# Patient Record
Sex: FEMALE | Race: WHITE | Employment: OTHER | URBAN - METROPOLITAN AREA
[De-identification: names, ages, dates, MRNs, and addresses within clinical notes are randomized per-mention and may not be internally consistent; named-entity substitution may affect disease eponyms.]

---

## 2017-01-12 RX ORDER — PRAVASTATIN SODIUM 10 MG
TABLET ORAL
Qty: 60 TABLET | Refills: 0 | Status: SHIPPED | OUTPATIENT
Start: 2017-01-12 | End: 2017-06-19

## 2017-05-17 ENCOUNTER — TELEPHONE (OUTPATIENT)
Dept: INTERNAL MEDICINE CLINIC | Facility: CLINIC | Age: 72
End: 2017-05-17

## 2017-05-17 NOTE — TELEPHONE ENCOUNTER
Pt calling back - pt has been taking lorazepam in the past     Stopped taking in October 2016     Spouse telling her he does not wish for her to take lorazepam because it causes memory loss     She is asking can she take something else     [phone]

## 2017-05-17 NOTE — TELEPHONE ENCOUNTER
Please advise - called patient who is grinding her teeth, dentist told patient nothing wrong with her teeth , it is anxiety. Patient has mouthguard but hard time sleeping with it. She was taking Lorazepam , but stopped in October.  Spouse does not want her

## 2017-05-17 NOTE — TELEPHONE ENCOUNTER
Pt is grinding her teeth and feel anxious pt when to the dentist and was told it was anxiety nothing wrong this her teeth. Pt like something prescribed for anxiety.

## 2017-05-17 NOTE — TELEPHONE ENCOUNTER
To nursing, I recom pt come in to be seen. I can see pt at 3:30 pm today--or come another day when she can come. Thanks.

## 2017-05-17 NOTE — PROGRESS NOTES
Candida Smith is a 66year old female who presents for     Pt called today requesting anxiety. She notes she is grinding her teeth and the dentist told her it is anxiety related.    Anxiety flaring for few wks \"in anticipation of\" going to nephew's we  age 63 multiple sclerosis   • Depression Mother    • Dementia Mother 80     Alzheimers   • Blindness Mother    • 3 siblings [Other] [OTHER]     • 2 daughters [Other] Sunny Argueta       1 had collapsed lung   • diverticulitis [Other] [OTHER] Mother    • Hyp Prescriptions:  escitalopram (LEXAPRO) 5 MG Oral Tab Take 1 tablet (5 mg total) by mouth daily.  Disp: 30 tablet Rfl: 5   PRAVASTATIN SODIUM 10 MG Oral Tab TAKE 1 TABLET BY MOUTH NIGHTLY Disp: 60 tablet Rfl: 0   Multiple Vitamins-Minerals (MULTI-VITAMIN/MIN

## 2017-05-17 NOTE — TELEPHONE ENCOUNTER
Called patient and relayed DR. SMALL message - verbalized understanding . Nohelia Baker put patient on schedule for today 3:30 with DR. Darrick Sotomayor

## 2017-06-19 NOTE — PROGRESS NOTES
Carole Juarez is a 66year old female who presents for     1 mo check for anxiety.    She notes the lexapro 5 mg daily added at 5/17/17 visit helped. Not grinding her teeth as much.   'the anxiety comes and goes. I'm fine around my old friends. \"  Is fea Hypertension Mother       Social History:     Smoking Status: Never Smoker                      Alcohol Use: Yes           0.0 oz/week       0 Standard drinks or equivalent per week       Comment: 2-3 glasses wine per week         Allergies:    Aricept [Do Multiple Vitamins-Minerals (MULTI-VITAMIN/MINERALS) Oral Tab Take 1 tablet by mouth daily. Disp:  Rfl:    Vitamin D3 (VITAMIN D3) 1000 UNITS Oral Tab Take 1 tablet by mouth daily.  Disp:  Rfl:    Fexofenadine HCl (ALLEGRA) 180 MG Oral Tab Take 1 tablet by

## 2017-08-14 ENCOUNTER — TELEPHONE (OUTPATIENT)
Dept: INTERNAL MEDICINE CLINIC | Facility: CLINIC | Age: 72
End: 2017-08-14

## 2017-08-14 ENCOUNTER — LAB ENCOUNTER (OUTPATIENT)
Dept: LAB | Age: 72
End: 2017-08-14
Attending: INTERNAL MEDICINE
Payer: MEDICARE

## 2017-08-14 DIAGNOSIS — E78.00 HYPERCHOLESTEROLEMIA: ICD-10-CM

## 2017-08-14 LAB
ALBUMIN SERPL BCP-MCNC: 3.8 G/DL (ref 3.5–4.8)
ALBUMIN/GLOB SERPL: 1.3 {RATIO} (ref 1–2)
ALP SERPL-CCNC: 46 U/L (ref 32–100)
ALT SERPL-CCNC: 18 U/L (ref 14–54)
ANION GAP SERPL CALC-SCNC: 8 MMOL/L (ref 0–18)
AST SERPL-CCNC: 19 U/L (ref 15–41)
BASOPHILS # BLD: 0 K/UL (ref 0–0.2)
BASOPHILS NFR BLD: 1 %
BILIRUB SERPL-MCNC: 0.8 MG/DL (ref 0.3–1.2)
BUN SERPL-MCNC: 20 MG/DL (ref 8–20)
BUN/CREAT SERPL: 21.1 (ref 10–20)
CALCIUM SERPL-MCNC: 9.2 MG/DL (ref 8.5–10.5)
CHLORIDE SERPL-SCNC: 104 MMOL/L (ref 95–110)
CHOLEST SERPL-MCNC: 309 MG/DL (ref 110–200)
CO2 SERPL-SCNC: 27 MMOL/L (ref 22–32)
CREAT SERPL-MCNC: 0.95 MG/DL (ref 0.5–1.5)
EOSINOPHIL # BLD: 0.2 K/UL (ref 0–0.7)
EOSINOPHIL NFR BLD: 4 %
ERYTHROCYTE [DISTWIDTH] IN BLOOD BY AUTOMATED COUNT: 12.8 % (ref 11–15)
GLOBULIN PLAS-MCNC: 3 G/DL (ref 2.5–3.7)
GLUCOSE SERPL-MCNC: 92 MG/DL (ref 70–99)
HCT VFR BLD AUTO: 40.8 % (ref 35–48)
HDLC SERPL-MCNC: 85 MG/DL
HGB BLD-MCNC: 14.1 G/DL (ref 12–16)
LDLC SERPL CALC-MCNC: 206 MG/DL (ref 0–99)
LYMPHOCYTES # BLD: 0.8 K/UL (ref 1–4)
LYMPHOCYTES NFR BLD: 19 %
MCH RBC QN AUTO: 33.3 PG (ref 27–32)
MCHC RBC AUTO-ENTMCNC: 34.7 G/DL (ref 32–37)
MCV RBC AUTO: 96 FL (ref 80–100)
MONOCYTES # BLD: 0.5 K/UL (ref 0–1)
MONOCYTES NFR BLD: 11 %
NEUTROPHILS # BLD AUTO: 2.9 K/UL (ref 1.8–7.7)
NEUTROPHILS NFR BLD: 66 %
NONHDLC SERPL-MCNC: 224 MG/DL
OSMOLALITY UR CALC.SUM OF ELEC: 290 MOSM/KG (ref 275–295)
PLATELET # BLD AUTO: 234 K/UL (ref 140–400)
PMV BLD AUTO: 7.8 FL (ref 7.4–10.3)
POTASSIUM SERPL-SCNC: 4.2 MMOL/L (ref 3.3–5.1)
PROT SERPL-MCNC: 6.8 G/DL (ref 5.9–8.4)
RBC # BLD AUTO: 4.25 M/UL (ref 3.7–5.4)
SODIUM SERPL-SCNC: 139 MMOL/L (ref 136–144)
TRIGL SERPL-MCNC: 89 MG/DL (ref 1–149)
TSH SERPL-ACNC: 2.95 UIU/ML (ref 0.45–5.33)
WBC # BLD AUTO: 4.4 K/UL (ref 4–11)

## 2017-08-14 PROCEDURE — 80061 LIPID PANEL: CPT

## 2017-08-14 PROCEDURE — 85025 COMPLETE CBC W/AUTO DIFF WBC: CPT

## 2017-08-14 PROCEDURE — 80053 COMPREHEN METABOLIC PANEL: CPT

## 2017-08-14 PROCEDURE — 81001 URINALYSIS AUTO W/SCOPE: CPT | Performed by: INTERNAL MEDICINE

## 2017-08-14 PROCEDURE — 36415 COLL VENOUS BLD VENIPUNCTURE: CPT

## 2017-08-14 PROCEDURE — 84443 ASSAY THYROID STIM HORMONE: CPT

## 2017-08-14 NOTE — TELEPHONE ENCOUNTER
To nursing,   please tell pt Lab done 8/14/17–CBC CMP TSH lipid UA– results are ok except cholesterol levels are quite high–total cholesterol 309, .   If she is willing to restart the pravastatin 10 mg daily that she has at home, she can do that unti

## 2017-08-14 NOTE — TELEPHONE ENCOUNTER
Called patient and relayed DR. SMALL message - patient verbalized understanding , states she does no thave any more pravastatin at home and will discuss at her appointment with DR. SMALL on 8/21

## 2017-08-17 PROBLEM — G30.1 LATE ONSET ALZHEIMER'S DISEASE WITHOUT BEHAVIORAL DISTURBANCE (HCC): Status: ACTIVE | Noted: 2017-08-17

## 2017-08-17 PROBLEM — F02.80 LATE ONSET ALZHEIMER'S DISEASE WITHOUT BEHAVIORAL DISTURBANCE (HCC): Status: ACTIVE | Noted: 2017-08-17

## 2017-08-21 ENCOUNTER — OFFICE VISIT (OUTPATIENT)
Dept: INTERNAL MEDICINE CLINIC | Facility: CLINIC | Age: 72
End: 2017-08-21

## 2017-08-21 VITALS
SYSTOLIC BLOOD PRESSURE: 130 MMHG | HEIGHT: 66 IN | HEART RATE: 79 BPM | TEMPERATURE: 98 F | OXYGEN SATURATION: 99 % | BODY MASS INDEX: 25.23 KG/M2 | DIASTOLIC BLOOD PRESSURE: 86 MMHG | WEIGHT: 157 LBS

## 2017-08-21 DIAGNOSIS — Z00.00 MEDICARE ANNUAL WELLNESS VISIT, SUBSEQUENT: Primary | ICD-10-CM

## 2017-08-21 DIAGNOSIS — F41.9 ANXIETY: ICD-10-CM

## 2017-08-21 DIAGNOSIS — E78.00 HYPERCHOLESTEROLEMIA: ICD-10-CM

## 2017-08-21 DIAGNOSIS — F02.80 LATE ONSET ALZHEIMER'S DISEASE WITHOUT BEHAVIORAL DISTURBANCE (HCC): ICD-10-CM

## 2017-08-21 DIAGNOSIS — G30.1 LATE ONSET ALZHEIMER'S DISEASE WITHOUT BEHAVIORAL DISTURBANCE (HCC): ICD-10-CM

## 2017-08-21 DIAGNOSIS — Z12.31 VISIT FOR SCREENING MAMMOGRAM: ICD-10-CM

## 2017-08-21 PROCEDURE — G0439 PPPS, SUBSEQ VISIT: HCPCS | Performed by: INTERNAL MEDICINE

## 2017-08-21 PROCEDURE — 99213 OFFICE O/P EST LOW 20 MIN: CPT | Performed by: INTERNAL MEDICINE

## 2017-08-21 PROCEDURE — G0463 HOSPITAL OUTPT CLINIC VISIT: HCPCS | Performed by: INTERNAL MEDICINE

## 2017-08-21 RX ORDER — PRAVASTATIN SODIUM 20 MG
20 TABLET ORAL NIGHTLY
Qty: 90 TABLET | Refills: 3 | Status: SHIPPED | OUTPATIENT
Start: 2017-08-21 | End: 2018-06-27 | Stop reason: DRUGHIGH

## 2017-08-21 NOTE — PROGRESS NOTES
Jazmyne Morris is a 66year old female who presents for     Medicare annual wellness and check at 2 mo. Saw Dr Lisa Sena at [facility] on 8/17/17. Prev neurologist was Dr. Nevin Ray. Dx Alzheimers and pt didn't tolerate namenda or aricept.  She presc status: Never Smoker                                                              Smokeless tobacco: Never Used                      Alcohol use: Yes           0.0 oz/week     Comment: 2-3 glasses wine per week         Allergies:    Aricept [Donepezil] of 8/2017 she dx alzheimers. No longer sees Dr Ryan Whittington. Has been intol of aricept (dizziness) and namenda. She rx exelon patch.  Is awaiting ins approval.   MRI brain with and w/o contrast  4/23/15--mild white matter changes c/w microvascular ischemia.     H tablet Rfl: 5   Multiple Vitamins-Minerals (MULTI-VITAMIN/MINERALS) Oral Tab Take 1 tablet by mouth daily. Disp:  Rfl:    Vitamin D3 (VITAMIN D3) 1000 UNITS Oral Tab Take 1 tablet by mouth daily.  Disp:  Rfl:    Fexofenadine HCl (ALLEGRA) 180 MG Oral Tab Ta multiple medications?: 1-Yes    Does pain affect your day to day activities?: 0-No     Have you had any memory issues?: 1-Yes    Fall/Risk Scorin    Scoring Interpretation: 0 - 3 No Risk     Depression Screening (PHQ-2/PHQ-9): Over the LAST 2 WEEKS   L 20/30 Left Eye Chart Acuity: 20/30     Cognitive Assessment     What day of the week is this?: Correct    What month is it?: Correct    What year is it?: Correct    Recall \"Ball\": Correct    Recall \"Flag\": Correct    Recall \"Tree\": Correct

## 2017-10-10 ENCOUNTER — TELEPHONE (OUTPATIENT)
Dept: INTERNAL MEDICINE CLINIC | Facility: CLINIC | Age: 72
End: 2017-10-10

## 2017-10-10 RX ORDER — ESCITALOPRAM OXALATE 10 MG/1
10 TABLET ORAL DAILY
Qty: 30 TABLET | Refills: 5 | Status: SHIPPED | OUTPATIENT
Start: 2017-10-10 | End: 2018-03-14

## 2017-10-10 NOTE — TELEPHONE ENCOUNTER
Pt is taking escitalopram     Pt takes one pill a day and is asking to talk to doctor about increasing it     When I asked pt why she states her  told her they are not working     Pharm using [facility]     Please call pt at [phone]

## 2017-10-10 NOTE — TELEPHONE ENCOUNTER
To Dr. Chinmay Dougherty - see below - pt/ think pt is a bit more anxious - going to see rex play football at [facility] Friday-Sunday. Can med be increased?

## 2017-10-12 ENCOUNTER — HOSPITAL ENCOUNTER (OUTPATIENT)
Dept: MAMMOGRAPHY | Facility: HOSPITAL | Age: 72
Discharge: HOME | End: 2017-10-12
Attending: INTERNAL MEDICINE
Payer: MEDICARE

## 2017-10-12 DIAGNOSIS — Z12.31 VISIT FOR SCREENING MAMMOGRAM: ICD-10-CM

## 2017-10-12 PROCEDURE — 77063 BREAST TOMOSYNTHESIS BI: CPT | Performed by: INTERNAL MEDICINE

## 2017-10-12 PROCEDURE — 77067 SCR MAMMO BI INCL CAD: CPT | Performed by: INTERNAL MEDICINE

## 2017-11-29 ENCOUNTER — OFFICE VISIT (OUTPATIENT)
Dept: INTERNAL MEDICINE CLINIC | Facility: CLINIC | Age: 72
End: 2017-11-29

## 2017-11-29 VITALS
SYSTOLIC BLOOD PRESSURE: 120 MMHG | BODY MASS INDEX: 25.23 KG/M2 | HEIGHT: 66 IN | OXYGEN SATURATION: 98 % | WEIGHT: 157 LBS | DIASTOLIC BLOOD PRESSURE: 86 MMHG | HEART RATE: 70 BPM | TEMPERATURE: 98 F

## 2017-11-29 DIAGNOSIS — G30.1 LATE ONSET ALZHEIMER'S DISEASE WITHOUT BEHAVIORAL DISTURBANCE (HCC): ICD-10-CM

## 2017-11-29 DIAGNOSIS — J06.9 URI, ACUTE: ICD-10-CM

## 2017-11-29 DIAGNOSIS — F41.9 ANXIETY: ICD-10-CM

## 2017-11-29 DIAGNOSIS — E78.00 HYPERCHOLESTEROLEMIA: Primary | ICD-10-CM

## 2017-11-29 DIAGNOSIS — F02.80 LATE ONSET ALZHEIMER'S DISEASE WITHOUT BEHAVIORAL DISTURBANCE (HCC): ICD-10-CM

## 2017-11-29 PROCEDURE — G0463 HOSPITAL OUTPT CLINIC VISIT: HCPCS | Performed by: INTERNAL MEDICINE

## 2017-11-29 PROCEDURE — 99214 OFFICE O/P EST MOD 30 MIN: CPT | Performed by: INTERNAL MEDICINE

## 2017-11-29 RX ORDER — AZITHROMYCIN 250 MG/1
TABLET, FILM COATED ORAL
Qty: 1 PACKAGE | Refills: 0 | Status: SHIPPED | OUTPATIENT
Start: 2017-11-29 | End: 2017-12-11

## 2017-11-29 NOTE — PROGRESS NOTES
Gary Engel is a 66year old female who presents for     3 month check    Cold sx. Head congestion and cough for a wk. No fever. No sputum. Tried cough syrup. Pt feels she caught a cold coming home on plane from [place].       Seeing Dr Julissa Mcallister at [facility] siblings Colby Lachelle Other    • 2 daughters Colby Larose Other      1 had collapsed lung   • Breast Cancer Maternal Grandmother      post      Social History:   Smoking status: Never Smoker                                                              Smokeless toba Anxiety--doing well on lexapro 10 mg daily -dose incr in phone call here 10/10/17.    Wishes to avoid lorazepam she had in the past b/c of the mild cognitive impairment.    went back to counsellor- Marty Sky Dr (LEXAPRO) 10 MG Oral Tab Take 1 tablet (10 mg total) by mouth daily. Disp: 30 tablet Rfl: 5   Pravastatin Sodium 20 MG Oral Tab Take 1 tablet (20 mg total) by mouth nightly.  Disp: 90 tablet Rfl: 3   rivastigmine (EXELON) 9.5 MG/24HR Transdermal Patch 24 Hr

## 2018-03-14 ENCOUNTER — OFFICE VISIT (OUTPATIENT)
Dept: INTERNAL MEDICINE CLINIC | Facility: CLINIC | Age: 73
End: 2018-03-14

## 2018-03-14 VITALS
HEIGHT: 66 IN | HEART RATE: 85 BPM | WEIGHT: 167 LBS | TEMPERATURE: 98 F | DIASTOLIC BLOOD PRESSURE: 86 MMHG | SYSTOLIC BLOOD PRESSURE: 134 MMHG | OXYGEN SATURATION: 98 % | BODY MASS INDEX: 26.84 KG/M2

## 2018-03-14 DIAGNOSIS — B07.9 WART OF SCALP: Primary | ICD-10-CM

## 2018-03-14 DIAGNOSIS — F41.9 ANXIETY: ICD-10-CM

## 2018-03-14 DIAGNOSIS — E78.00 HYPERCHOLESTEROLEMIA: ICD-10-CM

## 2018-03-14 DIAGNOSIS — F02.80 LATE ONSET ALZHEIMER'S DISEASE WITHOUT BEHAVIORAL DISTURBANCE (HCC): ICD-10-CM

## 2018-03-14 DIAGNOSIS — G30.1 LATE ONSET ALZHEIMER'S DISEASE WITHOUT BEHAVIORAL DISTURBANCE (HCC): ICD-10-CM

## 2018-03-14 PROCEDURE — G0463 HOSPITAL OUTPT CLINIC VISIT: HCPCS | Performed by: INTERNAL MEDICINE

## 2018-03-14 PROCEDURE — 99214 OFFICE O/P EST MOD 30 MIN: CPT | Performed by: INTERNAL MEDICINE

## 2018-03-14 RX ORDER — ESCITALOPRAM OXALATE 10 MG/1
10 TABLET ORAL DAILY
Qty: 90 TABLET | Refills: 3 | Status: SHIPPED | OUTPATIENT
Start: 2018-03-14 | End: 2019-08-01

## 2018-03-14 NOTE — PROGRESS NOTES
Jamil Vanegas is a 67year old female who presents for     3 month check     Cold sx of 11/29/18 resolved w zpack. Just ret from a trip from [facility] and Herlinda Sampson at [facility] since 8/2017.  Pt feels she is doing well w exelon pill Mother    • diverticulitis Lucy Kimball Mother    • 3 siblings Lucy Kimball Other    • 2 daughters Lucy Kimball Other      1 had collapsed lung   • Breast Cancer Maternal Grandmother      post      Social History:   Smoking status: Never Smoker hepatosplenomegaly  Extremities: no edema  Neurologic: alert and oriented  Affect -normal.    ASSESSMENT AND PLAN:     Wart on scalp--see her dermatologist--she can't recall name. I gave her a note--says her husb will know.      Hypercholesterolemia:  LDL 2 waiting room to ask him to help her remember to do this.      Ret in 3 months.      Patient expresses understanding of above issues and plan.        Current Outpatient Prescriptions:  Rivastigmine Tartrate (EXELON) 3 MG Oral Cap 1 po bid for one month, then

## 2018-06-15 ENCOUNTER — OFFICE VISIT (OUTPATIENT)
Dept: INTERNAL MEDICINE CLINIC | Facility: CLINIC | Age: 73
End: 2018-06-15

## 2018-06-15 VITALS
TEMPERATURE: 98 F | HEIGHT: 66 IN | DIASTOLIC BLOOD PRESSURE: 82 MMHG | HEART RATE: 84 BPM | WEIGHT: 171 LBS | BODY MASS INDEX: 27.48 KG/M2 | SYSTOLIC BLOOD PRESSURE: 136 MMHG

## 2018-06-15 DIAGNOSIS — F41.9 ANXIETY: ICD-10-CM

## 2018-06-15 DIAGNOSIS — G30.1 LATE ONSET ALZHEIMER'S DISEASE WITHOUT BEHAVIORAL DISTURBANCE (HCC): ICD-10-CM

## 2018-06-15 DIAGNOSIS — E78.00 HYPERCHOLESTEROLEMIA: Primary | ICD-10-CM

## 2018-06-15 DIAGNOSIS — F02.80 LATE ONSET ALZHEIMER'S DISEASE WITHOUT BEHAVIORAL DISTURBANCE (HCC): ICD-10-CM

## 2018-06-15 PROCEDURE — 99214 OFFICE O/P EST MOD 30 MIN: CPT | Performed by: INTERNAL MEDICINE

## 2018-06-15 PROCEDURE — G0463 HOSPITAL OUTPT CLINIC VISIT: HCPCS | Performed by: INTERNAL MEDICINE

## 2018-06-15 NOTE — PROGRESS NOTES
Shirlene Lara is a 67year old female who presents for     3 month check     Feels good. Going on a trip to [facility] in Aug.      Seeing Dr Bo Bingham at [address] [place] [address] Neurology since 8/2017.    Pt feels her memory is doing well w exelon pills--the patch Smokeless tobacco: Never Used                      Alcohol use: Yes           1.2 - 1.8 oz/week     Glasses of wine: 2 - 3 per week     Comment: 2-3 glasses wine per week         Allergies:    Aricept [Donepezil]         Comment:Dizziness  Atorva daily--pt had stopped in 5/2017 b/c of fears it would affect her memory. Reminder again to do LIPID AST in 6 wks. Hx lipitor caused leg pain.      Anxiety--doing well on lexapro 10 mg daily.  Avoid lorazepam had in the past b/c of cognitive impairment.    W tablet Rfl: 3   Rivastigmine Tartrate 6 MG Oral Cap Take 1 capsule (6 mg total) by mouth 2 (two) times daily. Disp: 60 capsule Rfl: 5   Multiple Vitamins-Minerals (MULTI-VITAMIN/MINERALS) Oral Tab Take 1 tablet by mouth daily.  Disp:  Rfl:    Vitamin D3 (VI

## 2018-06-22 ENCOUNTER — APPOINTMENT (OUTPATIENT)
Dept: LAB | Facility: HOSPITAL | Age: 73
End: 2018-06-22
Attending: INTERNAL MEDICINE
Payer: MEDICARE

## 2018-06-22 DIAGNOSIS — E78.00 HYPERCHOLESTEROLEMIA: ICD-10-CM

## 2018-06-22 PROCEDURE — 84450 TRANSFERASE (AST) (SGOT): CPT

## 2018-06-22 PROCEDURE — 36415 COLL VENOUS BLD VENIPUNCTURE: CPT

## 2018-06-22 PROCEDURE — 80061 LIPID PANEL: CPT

## 2018-06-27 ENCOUNTER — TELEPHONE (OUTPATIENT)
Dept: INTERNAL MEDICINE CLINIC | Facility: CLINIC | Age: 73
End: 2018-06-27

## 2018-06-27 DIAGNOSIS — E78.00 HYPERCHOLESTEROLEMIA: Primary | ICD-10-CM

## 2018-06-27 RX ORDER — PRAVASTATIN SODIUM 40 MG
40 TABLET ORAL NIGHTLY
Qty: 90 TABLET | Refills: 3 | Status: SHIPPED | OUTPATIENT
Start: 2018-06-27 | End: 2018-08-17 | Stop reason: ALTCHOICE

## 2018-06-27 NOTE — TELEPHONE ENCOUNTER
I sent patient results note Reinaldo Soto, I received your lab results from 6/22/18. Your cholesterol panel shows your LDL cholesterol remains elevated at 178;  albeit improved from LDL cholesterol 206 on 8/14/17.   I would like her LDL to be below

## 2018-06-27 NOTE — TELEPHONE ENCOUNTER
To nursing, please call patient on Thursday, 6/28/18 and tell patient to review the results note email I sent--if not already done. Thanks.

## 2018-06-28 NOTE — TELEPHONE ENCOUNTER
Called patient , spoke with  per hipaa ( spouse close by) and relayed DR. SMALL message - patient was looking at message while Rn on phone with spouse - will call with any questions

## 2018-07-12 ENCOUNTER — HOSPITAL (OUTPATIENT)
Dept: OTHER | Age: 73
End: 2018-07-12
Attending: EMERGENCY MEDICINE

## 2018-07-12 LAB
ALBUMIN SERPL-MCNC: 3.4 GM/DL (ref 3.6–5.1)
ALBUMIN/GLOB SERPL: 0.9 {RATIO} (ref 1–2.4)
ALP SERPL-CCNC: 61 UNIT/L (ref 45–117)
ALT SERPL-CCNC: 25 UNIT/L
ANALYZER ANC (IANC): ABNORMAL
ANION GAP SERPL CALC-SCNC: 17 MMOL/L (ref 10–20)
AST SERPL-CCNC: 26 UNIT/L
BASOPHILS # BLD: 0 THOUSAND/MCL (ref 0–0.3)
BASOPHILS NFR BLD: 1 %
BILIRUB SERPL-MCNC: 0.4 MG/DL (ref 0.2–1)
BUN SERPL-MCNC: 21 MG/DL (ref 6–20)
BUN/CREAT SERPL: 21 (ref 7–25)
CALCIUM SERPL-MCNC: 8.3 MG/DL (ref 8.4–10.2)
CHLORIDE: 103 MMOL/L (ref 98–107)
CO2 SERPL-SCNC: 23 MMOL/L (ref 21–32)
CREAT SERPL-MCNC: 0.98 MG/DL (ref 0.51–0.95)
DIFFERENTIAL METHOD BLD: ABNORMAL
EOSINOPHIL # BLD: 0.1 THOUSAND/MCL (ref 0.1–0.5)
EOSINOPHIL NFR BLD: 3 %
ERYTHROCYTE [DISTWIDTH] IN BLOOD: 12.5 % (ref 11–15)
GLOBULIN SER-MCNC: 3.6 GM/DL (ref 2–4)
GLUCOSE SERPL-MCNC: 112 MG/DL (ref 65–99)
HEMATOCRIT: 37.3 % (ref 36–46.5)
HGB BLD-MCNC: 12.8 GM/DL (ref 12–15.5)
LYMPHOCYTES # BLD: 1.5 THOUSAND/MCL (ref 1–4)
LYMPHOCYTES NFR BLD: 34 %
MCH RBC QN AUTO: 33.1 PG (ref 26–34)
MCHC RBC AUTO-ENTMCNC: 34.3 GM/DL (ref 32–36.5)
MCV RBC AUTO: 96.4 FL (ref 78–100)
MONOCYTES # BLD: 0.6 THOUSAND/MCL (ref 0.3–0.9)
MONOCYTES NFR BLD: 15 %
NEUTROPHILS # BLD: 2.1 THOUSAND/MCL (ref 1.8–7.7)
NEUTROPHILS NFR BLD: 47 %
NEUTS SEG NFR BLD: ABNORMAL %
NRBC (NRBCRE): ABNORMAL
PLATELET # BLD: 191 THOUSAND/MCL (ref 140–450)
POTASSIUM SERPL-SCNC: 3.7 MMOL/L (ref 3.4–5.1)
PROT SERPL-MCNC: 7 GM/DL (ref 6.4–8.2)
RBC # BLD: 3.87 MILLION/MCL (ref 4–5.2)
SODIUM SERPL-SCNC: 139 MMOL/L (ref 135–145)
TROPONIN I SERPL HS-MCNC: <0.02 NG/ML
WBC # BLD: 4.3 THOUSAND/MCL (ref 4.2–11)

## 2018-07-19 ENCOUNTER — OFFICE VISIT (OUTPATIENT)
Dept: INTERNAL MEDICINE CLINIC | Facility: CLINIC | Age: 73
End: 2018-07-19
Payer: MEDICARE

## 2018-07-19 VITALS
HEART RATE: 77 BPM | OXYGEN SATURATION: 98 % | TEMPERATURE: 98 F | HEIGHT: 66 IN | SYSTOLIC BLOOD PRESSURE: 120 MMHG | DIASTOLIC BLOOD PRESSURE: 76 MMHG

## 2018-07-19 DIAGNOSIS — S61.210S LACERATION OF RIGHT INDEX FINGER WITHOUT FOREIGN BODY WITHOUT DAMAGE TO NAIL, SEQUELA: Primary | ICD-10-CM

## 2018-07-19 PROCEDURE — 99213 OFFICE O/P EST LOW 20 MIN: CPT | Performed by: INTERNAL MEDICINE

## 2018-07-19 PROCEDURE — G0463 HOSPITAL OUTPT CLINIC VISIT: HCPCS | Performed by: INTERNAL MEDICINE

## 2018-07-19 NOTE — PROGRESS NOTES
Blair Resendiz is a 67year old female.     HPI:   Patient presents with:  Suture Removal: Patient is here for suture removal.      68 y/o F who suffered laceration to right index finger 7 d ago when cleaning son's apartment; pt was seen at [facility] 1.8 oz/week     Glasses of wine: 2 - 3 per week     Comment: 2-3 glasses wine per week       Medications (Active prior to today's visit):    Current Outpatient Prescriptions:  Pravastatin Sodium 40 MG Oral Tab Take 1 tablet (40 mg total) by mouth nightly. index finger with two sutures in placed; sutures removed; wound intact         ASSESSMENT/PLAN:   Finger laceration  fingerpad to right index finger with two sutures in placed; sutures removed; wound intact; bandage applied       Orders This Visit:  No ord

## 2018-08-14 ENCOUNTER — APPOINTMENT (OUTPATIENT)
Dept: LAB | Facility: HOSPITAL | Age: 73
End: 2018-08-14
Attending: INTERNAL MEDICINE
Payer: MEDICARE

## 2018-08-14 DIAGNOSIS — E78.00 HYPERCHOLESTEROLEMIA: ICD-10-CM

## 2018-08-14 LAB
AST SERPL-CCNC: 25 U/L (ref 15–41)
CHOLEST SERPL-MCNC: 260 MG/DL (ref 110–200)
HDLC SERPL-MCNC: 72 MG/DL
LDLC SERPL CALC-MCNC: 168 MG/DL (ref 0–99)
NONHDLC SERPL-MCNC: 188 MG/DL
TRIGL SERPL-MCNC: 99 MG/DL (ref 1–149)

## 2018-08-14 PROCEDURE — 80061 LIPID PANEL: CPT

## 2018-08-14 PROCEDURE — 36415 COLL VENOUS BLD VENIPUNCTURE: CPT

## 2018-08-14 PROCEDURE — 84450 TRANSFERASE (AST) (SGOT): CPT

## 2018-08-17 ENCOUNTER — TELEPHONE (OUTPATIENT)
Dept: INTERNAL MEDICINE CLINIC | Facility: CLINIC | Age: 73
End: 2018-08-17

## 2018-08-17 DIAGNOSIS — E78.00 HYPERCHOLESTEROLEMIA: Primary | ICD-10-CM

## 2018-08-17 RX ORDER — ROSUVASTATIN CALCIUM 10 MG/1
10 TABLET, COATED ORAL NIGHTLY
Qty: 90 TABLET | Refills: 3 | Status: SHIPPED | OUTPATIENT
Start: 2018-08-17 | End: 2018-12-31

## 2018-08-17 NOTE — TELEPHONE ENCOUNTER
To nursing,   please tell pt --best to talk to her  b/c pt has some memory problems-- results of lab done on 8/14/18-lipid AST--results show her LDL cholesterol is still elevated at 168--was 178 in June before increasing pravastatin to 40 mg daily.

## 2018-08-17 NOTE — TELEPHONE ENCOUNTER
calledpatient , spoke with spouse per hipaa andpatient and relayed DR. SMALL message - verbalized understanding .  RX sent

## 2018-09-19 ENCOUNTER — OFFICE VISIT (OUTPATIENT)
Dept: INTERNAL MEDICINE CLINIC | Facility: CLINIC | Age: 73
End: 2018-09-19
Payer: MEDICARE

## 2018-09-19 VITALS
BODY MASS INDEX: 27.77 KG/M2 | HEART RATE: 75 BPM | DIASTOLIC BLOOD PRESSURE: 80 MMHG | HEIGHT: 66 IN | WEIGHT: 172.81 LBS | SYSTOLIC BLOOD PRESSURE: 130 MMHG | OXYGEN SATURATION: 99 % | TEMPERATURE: 98 F

## 2018-09-19 DIAGNOSIS — Z12.31 VISIT FOR SCREENING MAMMOGRAM: ICD-10-CM

## 2018-09-19 DIAGNOSIS — D32.9 MENINGIOMA (HCC): ICD-10-CM

## 2018-09-19 DIAGNOSIS — G30.1 LATE ONSET ALZHEIMER'S DISEASE WITHOUT BEHAVIORAL DISTURBANCE (HCC): ICD-10-CM

## 2018-09-19 DIAGNOSIS — F41.9 ANXIETY: ICD-10-CM

## 2018-09-19 DIAGNOSIS — F02.80 LATE ONSET ALZHEIMER'S DISEASE WITHOUT BEHAVIORAL DISTURBANCE (HCC): ICD-10-CM

## 2018-09-19 DIAGNOSIS — E78.00 HYPERCHOLESTEROLEMIA: Primary | ICD-10-CM

## 2018-09-19 PROCEDURE — 90653 IIV ADJUVANT VACCINE IM: CPT | Performed by: INTERNAL MEDICINE

## 2018-09-19 PROCEDURE — G0463 HOSPITAL OUTPT CLINIC VISIT: HCPCS | Performed by: INTERNAL MEDICINE

## 2018-09-19 PROCEDURE — 99214 OFFICE O/P EST MOD 30 MIN: CPT | Performed by: INTERNAL MEDICINE

## 2018-09-19 PROCEDURE — G0008 ADMIN INFLUENZA VIRUS VAC: HCPCS | Performed by: INTERNAL MEDICINE

## 2018-09-19 NOTE — PROGRESS NOTES
Nabor Navarro is a 67year old female who presents for     3 month check     Feels good. Enjoyed a trip to [facility] in Aug.     Hypercholesterolemia-pravastatin was increased to 40 mg daily in June after .  Then on follow up lab in Aug LDL Breast Cancer Maternal Grandmother         post      Social History:   Social History    Tobacco Use      Smoking status: Never Smoker      Smokeless tobacco: Never Used    Alcohol use:  Yes      Alcohol/week: 1.2 - 1.8 oz      Types: 2 - 3 Glasses of wine lab. Hx lipitor caused leg pain and pravastatin not effective.      Anxiety--doing well on lexapro 10 mg daily. Avoid lorazepam had in the past b/c of cognitive impairment.    When needs counsellor, sees Romina eLe LCSW.     Alzheimer's disease--sees Medications:  Rosuvastatin Calcium 10 MG Oral Tab Take 1 tablet (10 mg total) by mouth nightly. Disp: 90 tablet Rfl: 3   escitalopram (LEXAPRO) 10 MG Oral Tab Take 1 tablet (10 mg total) by mouth daily.  Disp: 90 tablet Rfl: 3   Multiple Vitamins-Minerals (

## 2018-10-31 ENCOUNTER — TELEPHONE (OUTPATIENT)
Dept: INTERNAL MEDICINE CLINIC | Facility: CLINIC | Age: 73
End: 2018-10-31

## 2018-10-31 ENCOUNTER — HOSPITAL ENCOUNTER (OUTPATIENT)
Dept: CT IMAGING | Facility: HOSPITAL | Age: 73
Discharge: HOME | End: 2018-10-31
Attending: INTERNAL MEDICINE
Payer: MEDICARE

## 2018-10-31 DIAGNOSIS — D32.9 MENINGIOMA (HCC): ICD-10-CM

## 2018-10-31 PROCEDURE — 70450 CT HEAD/BRAIN W/O DYE: CPT | Performed by: INTERNAL MEDICINE

## 2018-10-31 NOTE — TELEPHONE ENCOUNTER
To nursing, please tell patient–and also please tell her  because patient has history of some memory problems– CAT scan of the head done today 10/31/18–results are okay.    the 2 cm meningioma is the same size as what was described on the CAT scan of

## 2018-10-31 NOTE — TELEPHONE ENCOUNTER
Called patient and relayed Dr Juan Manuel Ulrich message to her. She verbalized understanding and will make appt with eye doctor. Also relayed message to patient's  Buck Leiva. He verbalized understanding.

## 2018-11-28 ENCOUNTER — TELEPHONE (OUTPATIENT)
Dept: INTERNAL MEDICINE CLINIC | Facility: CLINIC | Age: 73
End: 2018-11-28

## 2018-11-29 NOTE — TELEPHONE ENCOUNTER
To nursing, please tell patient I received report of bilateral screening mammogram done 11/7/18 at [facility].  The report shows 2 asymmetric densities in the left breast for which they recommend she go back for additional views.   Please a

## 2018-11-29 NOTE — TELEPHONE ENCOUNTER
Called patient , spoke with spouse per hipaa and relayed DR. SMALL message - verbalized understanding.  Order request faxed to Elizabeth Llamas at [phone]- confirmation received - to scanning

## 2018-12-06 ENCOUNTER — TELEPHONE (OUTPATIENT)
Dept: INTERNAL MEDICINE CLINIC | Facility: CLINIC | Age: 73
End: 2018-12-06

## 2018-12-06 NOTE — TELEPHONE ENCOUNTER
To nursing, please tell patient 11/30/18- L mammo additional views and US L breast--results are okay, benign findings. The radiologist recommends next mammogram in 1 year. [facility]  Thanks.

## 2018-12-17 ENCOUNTER — OFFICE VISIT (OUTPATIENT)
Dept: INTERNAL MEDICINE CLINIC | Facility: CLINIC | Age: 73
End: 2018-12-17
Payer: MEDICARE

## 2018-12-17 VITALS
SYSTOLIC BLOOD PRESSURE: 140 MMHG | DIASTOLIC BLOOD PRESSURE: 80 MMHG | BODY MASS INDEX: 28.57 KG/M2 | HEART RATE: 83 BPM | OXYGEN SATURATION: 97 % | TEMPERATURE: 98 F | HEIGHT: 66 IN | WEIGHT: 177.81 LBS

## 2018-12-17 DIAGNOSIS — F02.80 LATE ONSET ALZHEIMER'S DISEASE WITHOUT BEHAVIORAL DISTURBANCE (HCC): ICD-10-CM

## 2018-12-17 DIAGNOSIS — F41.9 ANXIETY: ICD-10-CM

## 2018-12-17 DIAGNOSIS — D32.9 MENINGIOMA (HCC): ICD-10-CM

## 2018-12-17 DIAGNOSIS — R03.0 BORDERLINE BLOOD PRESSURE: ICD-10-CM

## 2018-12-17 DIAGNOSIS — E78.00 HYPERCHOLESTEROLEMIA: Primary | ICD-10-CM

## 2018-12-17 DIAGNOSIS — G30.1 LATE ONSET ALZHEIMER'S DISEASE WITHOUT BEHAVIORAL DISTURBANCE (HCC): ICD-10-CM

## 2018-12-17 PROCEDURE — G0463 HOSPITAL OUTPT CLINIC VISIT: HCPCS | Performed by: INTERNAL MEDICINE

## 2018-12-17 PROCEDURE — 99214 OFFICE O/P EST MOD 30 MIN: CPT | Performed by: INTERNAL MEDICINE

## 2018-12-17 NOTE — PROGRESS NOTES
Michelle Doyle is a 67year old female who presents for     3 month check     Feels good.      Hypercholesterolemia-tolerating crestor 10 mg daily. Not yet done f/u lab tests.      Alzheimers    Seeing Dr Julio Cesar Edmondson at [facility] since 8/2017.    She rx Blindness Mother    • Hypertension Mother    • Other (diverticulitis) Mother    • Other (3 siblings) Other    • Other (2 daughters) Other         1 had collapsed lung   • Breast Cancer Maternal Grandmother         post      Social History:   Social History AND PLAN:     BP borderline 140/80--will watch salt and lose wt. Check BP again at next visit. Hypercholesterolemia--on crestor 10 mg daily since 8/2018. To do f/u lab-reminder.  Hx lipitor caused leg pain and pravastatin not effective.      Anxiety--d instructions again given).       Ret in 3 months.      Patient expresses understanding of above issues and plan. Current Outpatient Medications:  Rosuvastatin Calcium 10 MG Oral Tab Take 1 tablet (10 mg total) by mouth nightly.  Disp: 90 tablet Rfl: 3

## 2018-12-21 ENCOUNTER — LAB ENCOUNTER (OUTPATIENT)
Dept: LAB | Facility: HOSPITAL | Age: 73
End: 2018-12-21
Attending: INTERNAL MEDICINE
Payer: MEDICARE

## 2018-12-21 DIAGNOSIS — E78.00 HYPERCHOLESTEROLEMIA: ICD-10-CM

## 2018-12-21 PROCEDURE — 85025 COMPLETE CBC W/AUTO DIFF WBC: CPT

## 2018-12-21 PROCEDURE — 80061 LIPID PANEL: CPT

## 2018-12-21 PROCEDURE — 36415 COLL VENOUS BLD VENIPUNCTURE: CPT

## 2018-12-21 PROCEDURE — 81001 URINALYSIS AUTO W/SCOPE: CPT | Performed by: INTERNAL MEDICINE

## 2018-12-21 PROCEDURE — 80053 COMPREHEN METABOLIC PANEL: CPT

## 2018-12-21 PROCEDURE — 84443 ASSAY THYROID STIM HORMONE: CPT

## 2018-12-30 ENCOUNTER — TELEPHONE (OUTPATIENT)
Dept: INTERNAL MEDICINE CLINIC | Facility: CLINIC | Age: 73
End: 2018-12-30

## 2018-12-30 DIAGNOSIS — E78.5 HYPERLIPIDEMIA, UNSPECIFIED HYPERLIPIDEMIA TYPE: Primary | ICD-10-CM

## 2018-12-30 DIAGNOSIS — R31.29 MICROHEMATURIA: ICD-10-CM

## 2018-12-30 NOTE — TELEPHONE ENCOUNTER
To nursing, please tell pt and please you will also need to tell her  b/c pt has memory problems:  Lab done 12/21/18-cbc cmp tsh lipid ua--  1) LDL choesterol is 139, improved from  in 8/2018. But would like LDL below 100 if possible.    Incre

## 2018-12-31 RX ORDER — ROSUVASTATIN CALCIUM 20 MG/1
20 TABLET, COATED ORAL NIGHTLY
Qty: 90 TABLET | Refills: 3 | Status: SHIPPED | OUTPATIENT
Start: 2018-12-31 | End: 2020-04-09

## 2018-12-31 NOTE — TELEPHONE ENCOUNTER
I spoke with patient's  and I relayed Dr. Man Holt message. He verbalized understanding. I sent in the prescription to patient's local pharmacy as requested.  I provided patient's  with the contact information for Dr. Claudette Jacob office and

## 2019-01-02 ENCOUNTER — APPOINTMENT (OUTPATIENT)
Dept: LAB | Facility: HOSPITAL | Age: 74
End: 2019-01-02
Attending: INTERNAL MEDICINE
Payer: MEDICARE

## 2019-01-02 DIAGNOSIS — R31.29 MICROHEMATURIA: ICD-10-CM

## 2019-01-02 PROCEDURE — 87086 URINE CULTURE/COLONY COUNT: CPT

## 2019-01-08 ENCOUNTER — TELEPHONE (OUTPATIENT)
Dept: INTERNAL MEDICINE CLINIC | Facility: CLINIC | Age: 74
End: 2019-01-08

## 2019-01-08 NOTE — TELEPHONE ENCOUNTER
To nursing, please tell patient and please you also need to tell her  because patient has memory problems–  Urine culture 1/2/19–results are negative. She has no urinary tract infection to explain the microscopic blood in the urine.   Go ahead and s

## 2019-01-08 NOTE — TELEPHONE ENCOUNTER
Spoke with pts  to relay MD message below. He voiced understanding and will make sure Jarod Maguire follows up with Dr. Daniel Lozano / Chinyere Rider as advised.

## 2019-02-14 ENCOUNTER — APPOINTMENT (OUTPATIENT)
Dept: LAB | Facility: HOSPITAL | Age: 74
End: 2019-02-14
Attending: INTERNAL MEDICINE
Payer: MEDICARE

## 2019-02-14 DIAGNOSIS — E78.5 HYPERLIPIDEMIA, UNSPECIFIED HYPERLIPIDEMIA TYPE: ICD-10-CM

## 2019-02-14 LAB
ALT SERPL-CCNC: 22 U/L (ref 13–56)
AST SERPL-CCNC: 13 U/L (ref 15–37)
CHOLEST SMN-MCNC: 194 MG/DL (ref ?–200)
HDLC SERPL-MCNC: 80 MG/DL (ref 40–59)
LDLC SERPL CALC-MCNC: 99 MG/DL (ref ?–100)
NONHDLC SERPL-MCNC: 114 MG/DL (ref ?–130)
TRIGL SERPL-MCNC: 74 MG/DL (ref 30–149)

## 2019-02-14 PROCEDURE — 36415 COLL VENOUS BLD VENIPUNCTURE: CPT

## 2019-02-14 PROCEDURE — 84460 ALANINE AMINO (ALT) (SGPT): CPT

## 2019-02-14 PROCEDURE — 80061 LIPID PANEL: CPT

## 2019-02-14 PROCEDURE — 84450 TRANSFERASE (AST) (SGOT): CPT

## 2019-02-17 ENCOUNTER — TELEPHONE (OUTPATIENT)
Dept: INTERNAL MEDICINE CLINIC | Facility: CLINIC | Age: 74
End: 2019-02-17

## 2019-02-17 NOTE — TELEPHONE ENCOUNTER
To nursing, please tell pt's  --best to tell him due to pt's memory difficulty--lab done 2/14/19-lipid ast alt--results are normal. LDL is good at 99. Continue rosuvastatin 20 mg daily. See me 3/18/19 as planned. Thanks.

## 2019-03-08 ENCOUNTER — HOSPITAL ENCOUNTER (OUTPATIENT)
Dept: ULTRASOUND IMAGING | Age: 74
Discharge: HOME | End: 2019-03-08
Attending: UROLOGY
Payer: MEDICARE

## 2019-03-08 DIAGNOSIS — R31.29 HEMATURIA, MICROSCOPIC: ICD-10-CM

## 2019-03-08 PROCEDURE — 76775 US EXAM ABDO BACK WALL LIM: CPT | Performed by: UROLOGY

## 2019-03-18 ENCOUNTER — OFFICE VISIT (OUTPATIENT)
Dept: INTERNAL MEDICINE CLINIC | Facility: CLINIC | Age: 74
End: 2019-03-18
Payer: MEDICARE

## 2019-03-18 VITALS
OXYGEN SATURATION: 98 % | TEMPERATURE: 98 F | SYSTOLIC BLOOD PRESSURE: 130 MMHG | HEART RATE: 71 BPM | BODY MASS INDEX: 29 KG/M2 | DIASTOLIC BLOOD PRESSURE: 80 MMHG | WEIGHT: 178 LBS

## 2019-03-18 DIAGNOSIS — F02.80 LATE ONSET ALZHEIMER'S DISEASE WITHOUT BEHAVIORAL DISTURBANCE (HCC): ICD-10-CM

## 2019-03-18 DIAGNOSIS — E78.00 HYPERCHOLESTEROLEMIA: Primary | ICD-10-CM

## 2019-03-18 DIAGNOSIS — R31.29 MICROSCOPIC HEMATURIA: ICD-10-CM

## 2019-03-18 DIAGNOSIS — G30.1 LATE ONSET ALZHEIMER'S DISEASE WITHOUT BEHAVIORAL DISTURBANCE (HCC): ICD-10-CM

## 2019-03-18 PROCEDURE — G0463 HOSPITAL OUTPT CLINIC VISIT: HCPCS | Performed by: INTERNAL MEDICINE

## 2019-03-18 PROCEDURE — 99214 OFFICE O/P EST MOD 30 MIN: CPT | Performed by: INTERNAL MEDICINE

## 2019-03-18 NOTE — PROGRESS NOTES
Marcelo Jaquez is a 75year old female who presents for     3 month check  Here with her .      Feels good.      Hypercholesterolemia- increased dose crestor 20 mg daily after  on 12/21/18. Follow up lab 2/14/19--LDL down to 99.  Feels ok on 3/8/19- 0.33 cm nonobstructing stone left kidney.    • Osteoarthritis    • Osteopenia 2008    dexa 2008   • Otitis media of right ear 2013    tube in R ear - Jun 2013 Dr Brown Quiet   • Shingles 2011   • Splenic artery aneurysm [facility]) 2007    CT chest; angio BMI 28.73 kg/m²       Wt Readings from Last 6 Encounters:  03/18/19 : 178 lb (80.7 kg)  12/17/18 : 177 lb 12.8 oz (80.6 kg)  09/19/18 : 172 lb 12.8 oz (78.4 kg)  06/15/18 : 171 lb (77.6 kg)  03/14/18 : 167 lb (75.8 kg)  11/29/17 : 157 lb (71.2 kg)    BP 14 Iliana.      Vitamin D deficiency-On vitamin D 1000 units a day.  Vit D level was 29.5 in Dec 2015.      Lul salas-saw   [address] [place] [address] maintenance:  Marcy Hawkins at 6/15/18 and 3/18/19 visit, tdap 9/09, pneumovax 11/11--Prevnar 12/

## 2019-08-02 RX ORDER — ESCITALOPRAM OXALATE 10 MG/1
TABLET ORAL
Qty: 90 TABLET | Refills: 3 | Status: SHIPPED | OUTPATIENT
Start: 2019-08-02 | End: 2020-05-13

## 2019-09-18 ENCOUNTER — OFFICE VISIT (OUTPATIENT)
Dept: INTERNAL MEDICINE CLINIC | Facility: CLINIC | Age: 74
End: 2019-09-18
Payer: MEDICARE

## 2019-09-18 VITALS
BODY MASS INDEX: 28.13 KG/M2 | SYSTOLIC BLOOD PRESSURE: 128 MMHG | HEART RATE: 80 BPM | DIASTOLIC BLOOD PRESSURE: 84 MMHG | TEMPERATURE: 98 F | WEIGHT: 175 LBS | HEIGHT: 66 IN

## 2019-09-18 DIAGNOSIS — E55.9 VITAMIN D DEFICIENCY: ICD-10-CM

## 2019-09-18 DIAGNOSIS — F41.9 ANXIETY: ICD-10-CM

## 2019-09-18 DIAGNOSIS — J06.9 URI, ACUTE: ICD-10-CM

## 2019-09-18 DIAGNOSIS — E78.00 HYPERCHOLESTEROLEMIA: Primary | ICD-10-CM

## 2019-09-18 DIAGNOSIS — F02.80 LATE ONSET ALZHEIMER'S DISEASE WITHOUT BEHAVIORAL DISTURBANCE (HCC): ICD-10-CM

## 2019-09-18 DIAGNOSIS — G30.1 LATE ONSET ALZHEIMER'S DISEASE WITHOUT BEHAVIORAL DISTURBANCE (HCC): ICD-10-CM

## 2019-09-18 PROCEDURE — 99214 OFFICE O/P EST MOD 30 MIN: CPT | Performed by: INTERNAL MEDICINE

## 2019-09-18 PROCEDURE — G0463 HOSPITAL OUTPT CLINIC VISIT: HCPCS | Performed by: INTERNAL MEDICINE

## 2019-09-18 NOTE — PROGRESS NOTES
Deb Jj is a 75year old female who presents for     6 month check     Feels good except has a cold. Caught a cold on cruise to [place] -over a wk. Returned 5 days ago. Has a cough--nonproductive, no ST. Clear drg w runny nose. No fever.   No wheezi KNEE ARTHROSCOPY Right May 2016    remove bone spur      Family History   Problem Relation Age of Onset   • Neurological Disorder Father          age 64 multiple sclerosis   • Depression Mother    • Dementia Mother 80        Alzheimers   • Blindness Mo regular rhythm, S1S2 normal without murmur  Breasts: no mass or axillary adenopathy at 9/18/19 visit  Abdomen: soft, nontender without mass or hepatosplenomegaly  Extremities: no edema  Neurologic: alert and oriented, date -off by one day sept 17, 2019.  Kn osteopenia. Gyn is Dr. Araseli Griffin. Colonoscopy 9/22/16- by Dr. Arian Donato at [facility] --polyp, diverticulosis, sm int hem. Next at 5 yrs. Sees Dr. Kole Mijares at [facility] for splenic artery aneurysm--discussed again to f/u with him--d/w pt/husb 3/18/19 visit.

## 2020-04-06 NOTE — TELEPHONE ENCOUNTER
Pt due for appt and fasting labs. Please call patient to schedule annual OV and advise patient that fasting labs are ordered  then back to clinical for refill.

## 2020-04-09 RX ORDER — ROSUVASTATIN CALCIUM 20 MG/1
TABLET, COATED ORAL
Qty: 90 TABLET | Refills: 3 | Status: SHIPPED | OUTPATIENT
Start: 2020-04-09 | End: 2021-09-02

## 2020-04-09 NOTE — TELEPHONE ENCOUNTER
Reviewed and Rx done  Requested Prescriptions     Signed Prescriptions Disp Refills   • ROSUVASTATIN CALCIUM 20 MG Oral Tab 90 tablet 3     Sig: TAKE 1 TABLET BY MOUTH EVERY EVENING     Authorizing Provider: Salmoe Coleman     Ordering User: Adán Jacobs

## 2020-05-08 ENCOUNTER — LAB ENCOUNTER (OUTPATIENT)
Dept: LAB | Facility: HOSPITAL | Age: 75
End: 2020-05-08
Attending: INTERNAL MEDICINE
Payer: MEDICARE

## 2020-05-08 DIAGNOSIS — E78.00 HYPERCHOLESTEROLEMIA: ICD-10-CM

## 2020-05-08 DIAGNOSIS — E55.9 VITAMIN D DEFICIENCY: ICD-10-CM

## 2020-05-08 PROCEDURE — 84443 ASSAY THYROID STIM HORMONE: CPT

## 2020-05-08 PROCEDURE — 82306 VITAMIN D 25 HYDROXY: CPT

## 2020-05-08 PROCEDURE — 80053 COMPREHEN METABOLIC PANEL: CPT

## 2020-05-08 PROCEDURE — 85025 COMPLETE CBC W/AUTO DIFF WBC: CPT

## 2020-05-08 PROCEDURE — 80061 LIPID PANEL: CPT

## 2020-05-08 PROCEDURE — 36415 COLL VENOUS BLD VENIPUNCTURE: CPT

## 2020-05-13 ENCOUNTER — OFFICE VISIT (OUTPATIENT)
Dept: INTERNAL MEDICINE CLINIC | Facility: CLINIC | Age: 75
End: 2020-05-13
Payer: MEDICARE

## 2020-05-13 VITALS
SYSTOLIC BLOOD PRESSURE: 122 MMHG | OXYGEN SATURATION: 98 % | HEIGHT: 66 IN | RESPIRATION RATE: 16 BRPM | BODY MASS INDEX: 29.25 KG/M2 | DIASTOLIC BLOOD PRESSURE: 82 MMHG | WEIGHT: 182 LBS | TEMPERATURE: 99 F | HEART RATE: 95 BPM

## 2020-05-13 DIAGNOSIS — F41.9 ANXIETY: ICD-10-CM

## 2020-05-13 DIAGNOSIS — D32.9 MENINGIOMA (HCC): ICD-10-CM

## 2020-05-13 DIAGNOSIS — E78.00 HYPERCHOLESTEROLEMIA: ICD-10-CM

## 2020-05-13 DIAGNOSIS — F02.80 LATE ONSET ALZHEIMER'S DISEASE WITHOUT BEHAVIORAL DISTURBANCE (HCC): ICD-10-CM

## 2020-05-13 DIAGNOSIS — Z00.00 MEDICARE ANNUAL WELLNESS VISIT, SUBSEQUENT: Primary | ICD-10-CM

## 2020-05-13 DIAGNOSIS — E55.9 VITAMIN D DEFICIENCY: ICD-10-CM

## 2020-05-13 DIAGNOSIS — G30.1 LATE ONSET ALZHEIMER'S DISEASE WITHOUT BEHAVIORAL DISTURBANCE (HCC): ICD-10-CM

## 2020-05-13 PROCEDURE — 99213 OFFICE O/P EST LOW 20 MIN: CPT | Performed by: INTERNAL MEDICINE

## 2020-05-13 PROCEDURE — G0463 HOSPITAL OUTPT CLINIC VISIT: HCPCS | Performed by: INTERNAL MEDICINE

## 2020-05-13 PROCEDURE — G0439 PPPS, SUBSEQ VISIT: HCPCS | Performed by: INTERNAL MEDICINE

## 2020-05-13 RX ORDER — MELATONIN
2000 DAILY
Qty: 30 TABLET | Refills: 0 | COMMUNITY
Start: 2020-05-13

## 2020-05-13 NOTE — PROGRESS NOTES
Patricia Recio is a 75year old female who presents for     Check at 8 months and medicare annual  Here with her . Derm \"froze\" some moles last wk.      Hypercholesterolemia- on crestor 20 mg daily. .     Jourdan Learn longer sees Dr Delano Smith at [facility]--[place] [facility]   • Vitamin D deficiency 2013      Past Surgical History:   Procedure Laterality Date   • KNEE ARTHROSCOPY Right 2009   • KNEE ARTHROSCOPY Right May 2016    remove bone spur      Family History   Problem Relation Age of : 171 lb (77.6 kg)      General: appears well nourished and in no acute distress  Neck -no adenopathy or thyroid abnormality, no carotid bruits  Lungs: clear to auscultation, coughing frequently  Heart: regular rhythm, S1S2 normal without murmur  Breasts: splenic artery aneurysm--discussed again to f/u with him--d/w pt/husb 3/18/19 visit and 5/13/20 visits.     Healthcare maintenance:  Leonila Wheeler again 5/13/20 visit, tdap 9/09-TD 7/12/18, pnvx11/11. Prevnar 12/14.  Flu shot in fall.    mammogram 38-65 or a female age 47-67, do you take aspirin?: No    Have you had any immunizations at another office such as Influenza, Hepatitis B, Tetanus, or Pneumococcal?: No     Functional Ability     Bathing or Showering: Able without help    Toileting: Able wi

## 2020-09-09 ENCOUNTER — TELEPHONE (OUTPATIENT)
Dept: INTERNAL MEDICINE CLINIC | Facility: CLINIC | Age: 75
End: 2020-09-09

## 2020-09-09 DIAGNOSIS — H91.90 HEARING LOSS, UNSPECIFIED HEARING LOSS TYPE, UNSPECIFIED LATERALITY: Primary | ICD-10-CM

## 2020-09-09 NOTE — TELEPHONE ENCOUNTER
Patients spouse Susannah Pratt is calling for an order for a hearing test  Please fax to    Dr Kerri Fernando # [phone]

## 2021-03-13 DIAGNOSIS — Z23 NEED FOR VACCINATION: ICD-10-CM

## 2021-08-31 NOTE — TELEPHONE ENCOUNTER
Patient last saw Liane Hart on 5/13/2020. Needs appointment for annual PE.  To EMA  to please call patient and assist with scheduling then back to Rx group for refill

## 2021-08-31 NOTE — TELEPHONE ENCOUNTER
Patient was called per request below. Patient answered and was informed she is due for a medicare annual (last MA in May 2020).  Patient verbalized understanding and stated that she will call back to schedule as her schedule is very full right now and she h

## 2021-09-02 RX ORDER — ROSUVASTATIN CALCIUM 20 MG/1
TABLET, COATED ORAL
Qty: 90 TABLET | Refills: 0 | OUTPATIENT
Start: 2021-09-02

## 2021-09-02 RX ORDER — ROSUVASTATIN CALCIUM 20 MG/1
TABLET, COATED ORAL
Qty: 30 TABLET | Refills: 0 | Status: SHIPPED | OUTPATIENT
Start: 2021-09-02 | End: 2021-11-17

## 2021-09-02 NOTE — TELEPHONE ENCOUNTER
Sent in today    Current refill request refused due to refill is either a duplicate request or has active refills at the pharmacy. Check previous templates.     Requested Prescriptions     Refused Prescriptions Disp Refills   • ROSUVASTATIN 20 MG Oral Tab

## 2021-09-02 NOTE — TELEPHONE ENCOUNTER
Noted, refill sent for 30-days as we are awaiting patient to call back and schedule appointment.     Requested Prescriptions     Signed Prescriptions Disp Refills   • ROSUVASTATIN 20 MG Oral Tab 30 tablet 0     Sig: TAKE 1 TABLET BY MOUTH EVERY EVENING

## 2021-11-04 NOTE — TELEPHONE ENCOUNTER
To FD---    LOV 5/2020. Please call patient to schedule annual OV then back to clinical for refill. Thanks!

## 2021-11-12 ENCOUNTER — TELEPHONE (OUTPATIENT)
Dept: INTERNAL MEDICINE CLINIC | Facility: CLINIC | Age: 76
End: 2021-11-12

## 2021-11-12 DIAGNOSIS — Z12.31 ENCOUNTER FOR SCREENING MAMMOGRAM FOR BREAST CANCER: Primary | ICD-10-CM

## 2021-11-12 NOTE — TELEPHONE ENCOUNTER
Patient is calling to request an order for a mammogram and a colonoscopy. Patient is looking to get these tests completed soon.       #[phone]

## 2021-11-12 NOTE — TELEPHONE ENCOUNTER
To nursing, please call and give information to her  as patient has Alzheimer's–  Mammogram order is in the system. Tell her she can call her gastroenterologist directly–Dr. Fercho Irby in [place] to set up the colonoscopy.   She does not need an orde

## 2021-11-12 NOTE — TELEPHONE ENCOUNTER
Please advise - order for mammogram pending. Guess patient needs recommendation for GI- has medicare thanks - to DR. Danilo Barragan

## 2021-11-17 RX ORDER — ROSUVASTATIN CALCIUM 20 MG/1
TABLET, COATED ORAL
Qty: 30 TABLET | Refills: 0 | Status: SHIPPED | OUTPATIENT
Start: 2021-11-17 | End: 2021-12-21

## 2021-11-17 NOTE — TELEPHONE ENCOUNTER
Noted, refill sent for 30-days.     Requested Prescriptions     Signed Prescriptions Disp Refills   • ROSUVASTATIN 20 MG Oral Tab 30 tablet 0     Sig: TAKE 1 TABLET BY MOUTH EVERY EVENING     Authorizing Provider: Gerardo Ley     Ordering User: David Shaw

## 2021-11-17 NOTE — TELEPHONE ENCOUNTER
Pt spouse called  appt scheduled for 12/21/21, first available appt on Dr Amanda Duarte schedule  Pt is low on medication  Tasked to [employer]

## 2021-12-21 ENCOUNTER — OFFICE VISIT (OUTPATIENT)
Dept: INTERNAL MEDICINE CLINIC | Facility: CLINIC | Age: 76
End: 2021-12-21
Payer: MEDICARE

## 2021-12-21 VITALS
TEMPERATURE: 98 F | BODY MASS INDEX: 27.64 KG/M2 | SYSTOLIC BLOOD PRESSURE: 130 MMHG | WEIGHT: 172 LBS | DIASTOLIC BLOOD PRESSURE: 84 MMHG | HEIGHT: 66 IN | HEART RATE: 80 BPM

## 2021-12-21 DIAGNOSIS — E55.9 VITAMIN D DEFICIENCY: ICD-10-CM

## 2021-12-21 DIAGNOSIS — Z00.00 MEDICARE ANNUAL WELLNESS VISIT, SUBSEQUENT: Primary | ICD-10-CM

## 2021-12-21 DIAGNOSIS — D32.9 MENINGIOMA (HCC): ICD-10-CM

## 2021-12-21 DIAGNOSIS — F02.80 LATE ONSET ALZHEIMER'S DISEASE WITHOUT BEHAVIORAL DISTURBANCE (HCC): ICD-10-CM

## 2021-12-21 DIAGNOSIS — E78.00 HYPERCHOLESTEROLEMIA: ICD-10-CM

## 2021-12-21 DIAGNOSIS — G30.1 LATE ONSET ALZHEIMER'S DISEASE WITHOUT BEHAVIORAL DISTURBANCE (HCC): ICD-10-CM

## 2021-12-21 PROCEDURE — 90732 PPSV23 VACC 2 YRS+ SUBQ/IM: CPT | Performed by: INTERNAL MEDICINE

## 2021-12-21 PROCEDURE — 99214 OFFICE O/P EST MOD 30 MIN: CPT | Performed by: INTERNAL MEDICINE

## 2021-12-21 PROCEDURE — G0009 ADMIN PNEUMOCOCCAL VACCINE: HCPCS | Performed by: INTERNAL MEDICINE

## 2021-12-21 PROCEDURE — G0439 PPPS, SUBSEQ VISIT: HCPCS | Performed by: INTERNAL MEDICINE

## 2021-12-21 RX ORDER — ROSUVASTATIN CALCIUM 20 MG/1
20 TABLET, COATED ORAL EVERY EVENING
Qty: 90 TABLET | Refills: 3 | Status: SHIPPED | OUTPATIENT
Start: 2021-12-21

## 2021-12-21 NOTE — PROGRESS NOTES
Marcelo Jaquez is a 67year old female who presents for     Medicare annual--last seen 5/13/20  Here with her . Derm \"froze\" some moles last wk. (in [facility]). Asks check of a bump under skin R labia.  Pt notes the dermatologist checked it and 2007    CT chest; angio 8/07 at [facility]; CT angio 11/10 1.9 cm--sees Dr Vertis Aschoff at [facility]   • Vitamin D deficiency 2013      Past Surgical History:   Procedure Laterality Date   • KNEE ARTHROSCOPY Right 2009   • KNEE ARTHROSCOPY Right May 2016    re visit  Abdomen: soft, nontender without mass or hepatosplenomegaly  On R labia majora, pt points to a tiny bump on skin--nothing noted where pt points--there is a small ~4MD hair follicle cyst near where she points. I recom she see her gyn.    Extremities: with him--d/w pt/husb 3/18/19 visit and 5/13/20 visits.     Healthcare maintenance:  Vaccines:  tdap 9/09-TD 7/12/18, shingrix av at pharm,  pnvx 12/21/21. Flu shot 10/22/21, covid vac- 2021.   mammo 1/10/20 Kaykay Cowart--benign findings.  New mammo ord e you maintain positive mental well-being?: Social Interaction;Puzzles; Visiting Friends; Visiting Family    If you are a male age 38-65 or a female age 47-67, do you take aspirin?: No    Have you had any immunizations at another office such as Influenza, Hepa a noisy background such as a crowded room or restaurant: Sometimes   I get confused about where sounds come from: No I misunderstand some words in a sentence and need to ask people to repeat themselves: No   I especially have trouble understanding the spee

## 2021-12-21 NOTE — PATIENT INSTRUCTIONS
Mammogram.   Restart crestor (rosuvastatin) 20 mg daily. Once back on this med for 30 days, do fasting lab tests. Follow up with Dr Ryan Barton, urologist for microscopic blood in urine. Colonoscopy Dr Michelle Hewitt. Gynecologist Dr Romayne Colt.

## 2022-02-18 ENCOUNTER — LAB ENCOUNTER (OUTPATIENT)
Dept: LAB | Facility: HOSPITAL | Age: 77
End: 2022-02-18
Attending: INTERNAL MEDICINE
Payer: MEDICARE

## 2022-02-18 ENCOUNTER — TELEPHONE (OUTPATIENT)
Dept: INTERNAL MEDICINE CLINIC | Facility: CLINIC | Age: 77
End: 2022-02-18

## 2022-02-18 DIAGNOSIS — E78.00 HYPERCHOLESTEROLEMIA: ICD-10-CM

## 2022-02-18 LAB
ALBUMIN SERPL-MCNC: 3.5 G/DL (ref 3.4–5)
ALBUMIN/GLOB SERPL: 0.9 {RATIO} (ref 1–2)
ALP LIVER SERPL-CCNC: 55 U/L
ALT SERPL-CCNC: 28 U/L
ANION GAP SERPL CALC-SCNC: 7 MMOL/L (ref 0–18)
AST SERPL-CCNC: 19 U/L (ref 15–37)
BASOPHILS # BLD AUTO: 0.05 X10(3) UL (ref 0–0.2)
BASOPHILS NFR BLD AUTO: 1.2 %
BILIRUB SERPL-MCNC: 0.5 MG/DL (ref 0.1–2)
BUN BLD-MCNC: 23 MG/DL (ref 7–18)
BUN/CREAT SERPL: 24.7 (ref 10–20)
CALCIUM BLD-MCNC: 8.9 MG/DL (ref 8.5–10.1)
CHLORIDE SERPL-SCNC: 108 MMOL/L (ref 98–112)
CHOLEST SERPL-MCNC: 226 MG/DL (ref ?–200)
CO2 SERPL-SCNC: 26 MMOL/L (ref 21–32)
CREAT BLD-MCNC: 0.93 MG/DL
DEPRECATED RDW RBC AUTO: 43.9 FL (ref 35.1–46.3)
EOSINOPHIL # BLD AUTO: 0.12 X10(3) UL (ref 0–0.7)
EOSINOPHIL NFR BLD AUTO: 2.8 %
ERYTHROCYTE [DISTWIDTH] IN BLOOD BY AUTOMATED COUNT: 12.2 % (ref 11–15)
FASTING PATIENT LIPID ANSWER: YES
FASTING STATUS PATIENT QL REPORTED: YES
GLOBULIN PLAS-MCNC: 3.7 G/DL (ref 2.8–4.4)
GLUCOSE BLD-MCNC: 93 MG/DL (ref 70–99)
HCT VFR BLD AUTO: 40.8 %
HDLC SERPL-MCNC: 82 MG/DL (ref 40–59)
HGB BLD-MCNC: 13.6 G/DL
IMM GRANULOCYTES # BLD AUTO: 0.02 X10(3) UL (ref 0–1)
IMM GRANULOCYTES NFR BLD: 0.5 %
LDLC SERPL CALC-MCNC: 129 MG/DL (ref ?–100)
LYMPHOCYTES # BLD AUTO: 0.89 X10(3) UL (ref 1–4)
LYMPHOCYTES NFR BLD AUTO: 20.9 %
MCH RBC QN AUTO: 32.4 PG (ref 26–34)
MCHC RBC AUTO-ENTMCNC: 33.3 G/DL (ref 31–37)
MCV RBC AUTO: 97.1 FL
MONOCYTES # BLD AUTO: 0.49 X10(3) UL (ref 0.1–1)
MONOCYTES NFR BLD AUTO: 11.5 %
NEUTROPHILS # BLD AUTO: 2.68 X10 (3) UL (ref 1.5–7.7)
NEUTROPHILS # BLD AUTO: 2.68 X10(3) UL (ref 1.5–7.7)
NEUTROPHILS NFR BLD AUTO: 63.1 %
NONHDLC SERPL-MCNC: 144 MG/DL (ref ?–130)
OSMOLALITY SERPL CALC.SUM OF ELEC: 295 MOSM/KG (ref 275–295)
PLATELET # BLD AUTO: 238 10(3)UL (ref 150–450)
POTASSIUM SERPL-SCNC: 4.1 MMOL/L (ref 3.5–5.1)
PROT SERPL-MCNC: 7.2 G/DL (ref 6.4–8.2)
RBC # BLD AUTO: 4.2 X10(6)UL
SODIUM SERPL-SCNC: 141 MMOL/L (ref 136–145)
TRIGL SERPL-MCNC: 87 MG/DL (ref 30–149)
TSI SER-ACNC: 3.01 MIU/ML (ref 0.36–3.74)
VIT D+METAB SERPL-MCNC: 23 NG/ML (ref 30–100)
VLDLC SERPL CALC-MCNC: 16 MG/DL (ref 0–30)
WBC # BLD AUTO: 4.3 X10(3) UL (ref 4–11)

## 2022-02-18 PROCEDURE — 80053 COMPREHEN METABOLIC PANEL: CPT

## 2022-02-18 PROCEDURE — 85025 COMPLETE CBC W/AUTO DIFF WBC: CPT

## 2022-02-18 PROCEDURE — 82306 VITAMIN D 25 HYDROXY: CPT | Performed by: INTERNAL MEDICINE

## 2022-02-18 PROCEDURE — 84443 ASSAY THYROID STIM HORMONE: CPT

## 2022-02-18 PROCEDURE — 80061 LIPID PANEL: CPT

## 2022-02-18 PROCEDURE — 36415 COLL VENOUS BLD VENIPUNCTURE: CPT

## 2022-02-18 RX ORDER — MELATONIN
3000 DAILY
Qty: 30 TABLET | Refills: 0 | COMMUNITY
Start: 2022-02-18

## 2022-02-18 NOTE — TELEPHONE ENCOUNTER
I spoke with patient's , Alex Kiara, [place] per HIPPA. I relayed Dr. Marisol Rizvi message. He verbalized understanding.

## 2022-06-29 ENCOUNTER — TELEPHONE (OUTPATIENT)
Dept: INTERNAL MEDICINE CLINIC | Facility: CLINIC | Age: 77
End: 2022-06-29

## 2022-08-16 ENCOUNTER — TELEPHONE (OUTPATIENT)
Dept: INTERNAL MEDICINE CLINIC | Facility: CLINIC | Age: 77
End: 2022-08-16

## 2022-08-16 NOTE — TELEPHONE ENCOUNTER
called  Pt has alzheimer's  She is having hallucinations/insisiting on things that have not happened, will not listen to reason  Requests call back for advice on this  Please call Monique Whitaker on his cell # [phone]

## 2022-08-16 NOTE — TELEPHONE ENCOUNTER
Spoke with , Isela Royal. Isela Royal reports pt has been having more frequent memory episodes--imaging events and becoming more irritable. Isela Royal denies any safety concerns, reports issue is not urgent. No violent behaviors. Isela Royal inquiring about treatment/referral options.  Isela Royal transferred to  to schedule clinic visit with Dr. Abdiel Kate to Dr. Oleksandr Ibanez

## 2022-08-22 RX ORDER — CITALOPRAM HYDROBROMIDE 10 MG/1
10 TABLET ORAL DAILY
Qty: 30 TABLET | Refills: 11 | Status: SHIPPED | OUTPATIENT
Start: 2022-08-22 | End: 2022-09-26 | Stop reason: DRUGHIGH

## 2022-08-22 NOTE — TELEPHONE ENCOUNTER
To nursing, please tell patient's    I sent Rx to [facility] in [place] for citalopram 10 mg daily #30 with 11 refills. It may take 1 to 2 weeks before potential effect of the medicine seen. Lorazepam is not recommended in Alzheimer patients. See me 8/24/2022 as planned. She also should make an appointment to see the neurologist she saw in the past-- Dr Corinne Mariner at [facility]. Thanks.

## 2022-08-22 NOTE — TELEPHONE ENCOUNTER
Spoke with spouse Zan Johansen (hipaa verified). Reports paranoia and delusions for 1 year. In the past 2-3 months, it has gotten worse. States, patient is acting paranoid and accusing spouse of throwing away her wallet, glasses. She confuses appointments or states the doctor requested to see her. Denies visual or auditory hallucinations. No physical/abusive outburst. States, she verbally lashes out. States, she has stated that she wishes she was dead. CSSR completed. No plans, or attempts or suicide. Previously saw neurologist 3-4 years ago. Medications were started for Alzheimer's to slow the progression. Spouse stopped meds d/t side effects. States, the benefits did not outweigh side effects. To on call Dr. Rachael Shaw: I offered a sooner appt with associate. Spouse prefers for patient to see PCP. Asking if anything can be prescribed in the meantime? Something to calm her down. Patient was given Lorazepam in the past. Stopped d/t memory loss side effect. spouse was notified that this message will be routed to the physician to determine what their recommendation (s) would be. In the meantime, if they develop new or worsening symptoms, they were advised to call back or seek emergent evaluation at the ER.

## 2022-08-22 NOTE — TELEPHONE ENCOUNTER
Relayed MDs message to spouse-verbalized understanding.  Appointment scheduled for 8/24/22    FYI dr. Cecilio Sacks

## 2022-08-22 NOTE — TELEPHONE ENCOUNTER
Pt spouse calling  Symptoms getting worse  Pt has appt on Wednesday with Dr Esperanza Sainz to discuss with nursing to see if there is something they can do  Transferred call to triage

## 2022-08-22 NOTE — TELEPHONE ENCOUNTER
I spoke with patient's , [place] per HIPPA, and relayed Dr. Cami Colon message. He verbalized understanding. I provided him with Dr. Martina Johnson phone number.

## 2022-08-24 ENCOUNTER — LAB ENCOUNTER (OUTPATIENT)
Dept: LAB | Age: 77
End: 2022-08-24
Attending: INTERNAL MEDICINE
Payer: MEDICARE

## 2022-08-24 ENCOUNTER — TELEPHONE (OUTPATIENT)
Dept: INTERNAL MEDICINE CLINIC | Facility: CLINIC | Age: 77
End: 2022-08-24

## 2022-08-24 DIAGNOSIS — R41.0 CONFUSION: ICD-10-CM

## 2022-08-24 DIAGNOSIS — G30.1 LATE ONSET ALZHEIMER'S DEMENTIA WITH BEHAVIORAL DISTURBANCE (HCC): ICD-10-CM

## 2022-08-24 DIAGNOSIS — F02.81 LATE ONSET ALZHEIMER'S DEMENTIA WITH BEHAVIORAL DISTURBANCE (HCC): ICD-10-CM

## 2022-08-24 DIAGNOSIS — R63.4 WEIGHT LOSS: ICD-10-CM

## 2022-08-24 LAB
ALBUMIN SERPL-MCNC: 3.8 G/DL (ref 3.4–5)
ALBUMIN/GLOB SERPL: 1 {RATIO} (ref 1–2)
ALP LIVER SERPL-CCNC: 57 U/L
ALT SERPL-CCNC: 34 U/L
ANION GAP SERPL CALC-SCNC: 9 MMOL/L (ref 0–18)
AST SERPL-CCNC: 28 U/L (ref 15–37)
BASOPHILS # BLD AUTO: 0.05 X10(3) UL (ref 0–0.2)
BASOPHILS NFR BLD AUTO: 0.8 %
BILIRUB SERPL-MCNC: 0.6 MG/DL (ref 0.1–2)
BILIRUB UR QL: NEGATIVE
BUN BLD-MCNC: 13 MG/DL (ref 7–18)
BUN/CREAT SERPL: 14 (ref 10–20)
CALCIUM BLD-MCNC: 9.5 MG/DL (ref 8.5–10.1)
CHLORIDE SERPL-SCNC: 106 MMOL/L (ref 98–112)
CLARITY UR: CLEAR
CO2 SERPL-SCNC: 23 MMOL/L (ref 21–32)
COLOR UR: YELLOW
CREAT BLD-MCNC: 0.93 MG/DL
DEPRECATED RDW RBC AUTO: 46.1 FL (ref 35.1–46.3)
EOSINOPHIL # BLD AUTO: 0.05 X10(3) UL (ref 0–0.7)
EOSINOPHIL NFR BLD AUTO: 0.8 %
ERYTHROCYTE [DISTWIDTH] IN BLOOD BY AUTOMATED COUNT: 12.9 % (ref 11–15)
FASTING STATUS PATIENT QL REPORTED: NO
GFR SERPLBLD BASED ON 1.73 SQ M-ARVRAT: 63 ML/MIN/1.73M2 (ref 60–?)
GLOBULIN PLAS-MCNC: 3.8 G/DL (ref 2.8–4.4)
GLUCOSE BLD-MCNC: 110 MG/DL (ref 70–99)
GLUCOSE UR-MCNC: NEGATIVE MG/DL
HCT VFR BLD AUTO: 41.8 %
HGB BLD-MCNC: 13.8 G/DL
IMM GRANULOCYTES # BLD AUTO: 0.02 X10(3) UL (ref 0–1)
IMM GRANULOCYTES NFR BLD: 0.3 %
KETONES UR-MCNC: NEGATIVE MG/DL
LYMPHOCYTES # BLD AUTO: 0.8 X10(3) UL (ref 1–4)
LYMPHOCYTES NFR BLD AUTO: 12.4 %
MCH RBC QN AUTO: 32 PG (ref 26–34)
MCHC RBC AUTO-ENTMCNC: 33 G/DL (ref 31–37)
MCV RBC AUTO: 97 FL
MONOCYTES # BLD AUTO: 0.61 X10(3) UL (ref 0.1–1)
MONOCYTES NFR BLD AUTO: 9.5 %
NEUTROPHILS # BLD AUTO: 4.9 X10 (3) UL (ref 1.5–7.7)
NEUTROPHILS # BLD AUTO: 4.9 X10(3) UL (ref 1.5–7.7)
NEUTROPHILS NFR BLD AUTO: 76.2 %
NITRITE UR QL STRIP.AUTO: NEGATIVE
OSMOLALITY SERPL CALC.SUM OF ELEC: 287 MOSM/KG (ref 275–295)
PH UR: 6 [PH] (ref 5–8)
PLATELET # BLD AUTO: 300 10(3)UL (ref 150–450)
POTASSIUM SERPL-SCNC: 4 MMOL/L (ref 3.5–5.1)
PROT SERPL-MCNC: 7.6 G/DL (ref 6.4–8.2)
RBC # BLD AUTO: 4.31 X10(6)UL
SODIUM SERPL-SCNC: 138 MMOL/L (ref 136–145)
SP GR UR STRIP: 1.01 (ref 1–1.03)
TSI SER-ACNC: 2.12 MIU/ML (ref 0.36–3.74)
UROBILINOGEN UR STRIP-ACNC: 0.2
WBC # BLD AUTO: 6.4 X10(3) UL (ref 4–11)
WBC #/AREA URNS AUTO: >50 /HPF

## 2022-08-24 PROCEDURE — 80053 COMPREHEN METABOLIC PANEL: CPT

## 2022-08-24 PROCEDURE — 81015 MICROSCOPIC EXAM OF URINE: CPT

## 2022-08-24 PROCEDURE — 36415 COLL VENOUS BLD VENIPUNCTURE: CPT

## 2022-08-24 PROCEDURE — 84443 ASSAY THYROID STIM HORMONE: CPT

## 2022-08-24 PROCEDURE — 82306 VITAMIN D 25 HYDROXY: CPT | Performed by: INTERNAL MEDICINE

## 2022-08-24 PROCEDURE — 85025 COMPLETE CBC W/AUTO DIFF WBC: CPT

## 2022-08-24 PROCEDURE — 81001 URINALYSIS AUTO W/SCOPE: CPT

## 2022-08-24 PROCEDURE — 87086 URINE CULTURE/COLONY COUNT: CPT

## 2022-08-25 RX ORDER — NITROFURANTOIN 25; 75 MG/1; MG/1
100 CAPSULE ORAL 2 TIMES DAILY
Qty: 10 CAPSULE | Refills: 0 | Status: SHIPPED | OUTPATIENT
Start: 2022-08-25

## 2022-09-15 ENCOUNTER — APPOINTMENT (OUTPATIENT)
Dept: GENERAL RADIOLOGY | Age: 77
End: 2022-09-15
Attending: EMERGENCY MEDICINE

## 2022-09-15 ENCOUNTER — TELEPHONE (OUTPATIENT)
Dept: INTERNAL MEDICINE CLINIC | Facility: CLINIC | Age: 77
End: 2022-09-15

## 2022-09-15 ENCOUNTER — HOSPITAL ENCOUNTER (OUTPATIENT)
Age: 77
Discharge: HOME | End: 2022-09-15
Attending: EMERGENCY MEDICINE

## 2022-09-15 VITALS
SYSTOLIC BLOOD PRESSURE: 136 MMHG | HEART RATE: 96 BPM | TEMPERATURE: 98 F | RESPIRATION RATE: 20 BRPM | DIASTOLIC BLOOD PRESSURE: 87 MMHG | OXYGEN SATURATION: 100 %

## 2022-09-15 DIAGNOSIS — M17.11 OSTEOARTHRITIS OF RIGHT KNEE, UNSPECIFIED OSTEOARTHRITIS TYPE: ICD-10-CM

## 2022-09-15 DIAGNOSIS — S80.01XA CONTUSION OF RIGHT KNEE, INITIAL ENCOUNTER: Primary | ICD-10-CM

## 2022-09-15 PROCEDURE — 73560 X-RAY EXAM OF KNEE 1 OR 2: CPT | Performed by: EMERGENCY MEDICINE

## 2022-09-15 PROCEDURE — 99213 OFFICE O/P EST LOW 20 MIN: CPT

## 2022-09-15 PROCEDURE — 99203 OFFICE O/P NEW LOW 30 MIN: CPT

## 2022-09-15 NOTE — TELEPHONE ENCOUNTER
I spoke with Charyl Meuse, [place] per HIPPA. He explained the patient is having clicking sounds in her right knee. He can feel something moving inside the knee. He says the patient recently fell while going up the stairs at their hour and she hit the right knee. This seems to have aggravated things. She was not having this clicking sound prior to the fall. He explained that the patient saw Dr. Cristin Medina years ago for the right knee but he has retired. I recommended urgent care evaluation in [place] today. Patient's  is agreeable. I provided him with the address of the urgent care. Nursing please follow up tomorrow.

## 2022-09-15 NOTE — TELEPHONE ENCOUNTER
Pt has been experiencing knee issues, hit knee on corner of a stair  Requesting recommendation for orthopedic  Tasked to nursing

## 2022-09-15 NOTE — ED INITIAL ASSESSMENT (HPI)
Patient with right medial knee pain after falling a few weeks ago. Hx of meniscus repair to the right knee. Reports a clicking sound with movement.

## 2022-09-16 NOTE — TELEPHONE ENCOUNTER
Was seen in [address]  Knee x-ray done .  No fracture ,trace joint effusion mild to moderate tricompartmental osteoarthritis

## 2022-09-26 ENCOUNTER — LAB ENCOUNTER (OUTPATIENT)
Dept: LAB | Age: 77
End: 2022-09-26
Attending: INTERNAL MEDICINE

## 2022-09-26 DIAGNOSIS — R82.90 ABNORMAL URINALYSIS: ICD-10-CM

## 2022-09-26 LAB
BILIRUB UR QL: NEGATIVE
CLARITY UR: CLEAR
COLOR UR: YELLOW
GLUCOSE UR-MCNC: NEGATIVE MG/DL
KETONES UR-MCNC: NEGATIVE MG/DL
LEUKOCYTE ESTERASE UR QL STRIP.AUTO: NEGATIVE
NITRITE UR QL STRIP.AUTO: NEGATIVE
PH UR: 6 [PH] (ref 5–8)
PROT UR-MCNC: NEGATIVE MG/DL
SP GR UR STRIP: 1.01 (ref 1–1.03)
UROBILINOGEN UR STRIP-ACNC: 0.2

## 2022-09-26 PROCEDURE — 87086 URINE CULTURE/COLONY COUNT: CPT

## 2022-09-26 PROCEDURE — 81015 MICROSCOPIC EXAM OF URINE: CPT

## 2022-09-26 PROCEDURE — 81001 URINALYSIS AUTO W/SCOPE: CPT

## 2022-09-26 NOTE — PATIENT INSTRUCTIONS
Ortho--Dr Avelina Roy. Increase citalopram 20 mg daily. Urine testing. Weigh weekly and call if wt continues to decrease.

## 2022-09-27 ENCOUNTER — TELEPHONE (OUTPATIENT)
Dept: INTERNAL MEDICINE CLINIC | Facility: CLINIC | Age: 77
End: 2022-09-27

## 2022-11-10 ENCOUNTER — HOSPITAL ENCOUNTER (OUTPATIENT)
Age: 77
Discharge: HOME | End: 2022-11-10
Attending: EMERGENCY MEDICINE
Payer: MEDICARE

## 2022-11-10 ENCOUNTER — TELEPHONE (OUTPATIENT)
Dept: INTERNAL MEDICINE CLINIC | Facility: CLINIC | Age: 77
End: 2022-11-10

## 2022-11-10 VITALS
HEART RATE: 98 BPM | SYSTOLIC BLOOD PRESSURE: 154 MMHG | TEMPERATURE: 98 F | RESPIRATION RATE: 20 BRPM | DIASTOLIC BLOOD PRESSURE: 92 MMHG | OXYGEN SATURATION: 98 %

## 2022-11-10 DIAGNOSIS — R31.9 URINARY TRACT INFECTION WITH HEMATURIA, SITE UNSPECIFIED: Primary | ICD-10-CM

## 2022-11-10 DIAGNOSIS — N39.0 URINARY TRACT INFECTION WITH HEMATURIA, SITE UNSPECIFIED: Primary | ICD-10-CM

## 2022-11-10 LAB
BILIRUB UR QL STRIP: NEGATIVE
COLOR UR: YELLOW
GLUCOSE UR STRIP-MCNC: NEGATIVE MG/DL
KETONES UR STRIP-MCNC: NEGATIVE MG/DL
NITRITE UR QL STRIP: NEGATIVE
PH UR STRIP: 6.5 [PH]
PROT UR STRIP-MCNC: 100 MG/DL
SP GR UR STRIP: 1.01
UROBILINOGEN UR STRIP-ACNC: <2 MG/DL

## 2022-11-10 PROCEDURE — 87086 URINE CULTURE/COLONY COUNT: CPT | Performed by: EMERGENCY MEDICINE

## 2022-11-10 PROCEDURE — 81002 URINALYSIS NONAUTO W/O SCOPE: CPT

## 2022-11-10 PROCEDURE — 87088 URINE BACTERIA CULTURE: CPT | Performed by: EMERGENCY MEDICINE

## 2022-11-10 PROCEDURE — 87186 SC STD MICRODIL/AGAR DIL: CPT | Performed by: EMERGENCY MEDICINE

## 2022-11-10 PROCEDURE — 99213 OFFICE O/P EST LOW 20 MIN: CPT

## 2022-11-10 RX ORDER — SULFAMETHOXAZOLE AND TRIMETHOPRIM 800; 160 MG/1; MG/1
1 TABLET ORAL 2 TIMES DAILY
Qty: 6 TABLET | Refills: 0 | Status: SHIPPED | OUTPATIENT
Start: 2022-11-10 | End: 2022-11-13

## 2022-11-10 NOTE — TELEPHONE ENCOUNTER
Noted.   I see UA 11/10/22 per Dr Mee Garcia at [facility] --UA--cloudy, 100 mg protein. large blood, large leuk esterase. Ucx pending.

## 2022-11-10 NOTE — TELEPHONE ENCOUNTER
Pt spouse called  Pt experiencing blood in urine today  Please call to discuss/advise  Tasked to nursing

## 2022-11-10 NOTE — TELEPHONE ENCOUNTER
To Dr Jameson Main --     UTI symptoms:    [x]Frequency  [x]Urgency   []Pain/burning  [x]Blood in urine  [x]Low back pain    Rates /10, pt unable to rate, states it is moderate. []Flank pain  []Fevers/chills  []Odor  []Confusion    NOTES:  Spoke with marianna Chao per [facility], pt on speaker phone. States yesterday he noticed the patients urine was red/pink tinged. States at the time the pt has no other symptoms. States this morning her urine was red and she reports above symptoms  Asher Hackett states the pt has been hydrating this morning and her urine is now pale pink. States the pt has been taking 2 Ashwagandha gummies daily. Asher Hackett states she took 5 yesterday. Advised pt to be evaluated UC or ER for symptoms above. Asher Hackett states he would like to take her to [place] [facility] and will report there now.      Nursing to F/U

## 2022-12-05 ENCOUNTER — OFFICE VISIT (OUTPATIENT)
Dept: INTERNAL MEDICINE CLINIC | Facility: CLINIC | Age: 77
End: 2022-12-05
Payer: MEDICARE

## 2022-12-05 ENCOUNTER — LAB ENCOUNTER (OUTPATIENT)
Dept: LAB | Age: 77
End: 2022-12-05
Attending: INTERNAL MEDICINE
Payer: MEDICARE

## 2022-12-05 VITALS
HEIGHT: 66 IN | BODY MASS INDEX: 21.76 KG/M2 | OXYGEN SATURATION: 98 % | DIASTOLIC BLOOD PRESSURE: 80 MMHG | WEIGHT: 135.38 LBS | RESPIRATION RATE: 16 BRPM | TEMPERATURE: 97 F | SYSTOLIC BLOOD PRESSURE: 130 MMHG | HEART RATE: 90 BPM

## 2022-12-05 DIAGNOSIS — F41.9 ANXIETY: ICD-10-CM

## 2022-12-05 DIAGNOSIS — M17.11 PRIMARY OSTEOARTHRITIS OF RIGHT KNEE: ICD-10-CM

## 2022-12-05 DIAGNOSIS — G30.1 LATE ONSET ALZHEIMER'S DISEASE WITHOUT BEHAVIORAL DISTURBANCE (HCC): ICD-10-CM

## 2022-12-05 DIAGNOSIS — Z23 NEED FOR IMMUNIZATION AGAINST INFLUENZA: ICD-10-CM

## 2022-12-05 DIAGNOSIS — F02.80 LATE ONSET ALZHEIMER'S DISEASE WITHOUT BEHAVIORAL DISTURBANCE (HCC): ICD-10-CM

## 2022-12-05 DIAGNOSIS — I72.8 ANEURYSM OF SPLENIC ARTERY (HCC): ICD-10-CM

## 2022-12-05 DIAGNOSIS — B96.20 E. COLI UTI: ICD-10-CM

## 2022-12-05 DIAGNOSIS — E78.00 HYPERCHOLESTEROLEMIA: ICD-10-CM

## 2022-12-05 DIAGNOSIS — Z00.00 MEDICARE ANNUAL WELLNESS VISIT, SUBSEQUENT: Primary | ICD-10-CM

## 2022-12-05 DIAGNOSIS — R63.4 WEIGHT LOSS: ICD-10-CM

## 2022-12-05 DIAGNOSIS — N39.0 E. COLI UTI: ICD-10-CM

## 2022-12-05 LAB
BILIRUB UR QL: NEGATIVE
CLARITY UR: CLEAR
COLOR UR: YELLOW
GLUCOSE UR-MCNC: NEGATIVE MG/DL
LEUKOCYTE ESTERASE UR QL STRIP.AUTO: NEGATIVE
NITRITE UR QL STRIP.AUTO: NEGATIVE
PH UR: 6 [PH] (ref 5–8)
SP GR UR STRIP: 1.02 (ref 1–1.03)
UROBILINOGEN UR STRIP-ACNC: 0.2

## 2022-12-05 PROCEDURE — 87086 URINE CULTURE/COLONY COUNT: CPT

## 2022-12-05 PROCEDURE — 81001 URINALYSIS AUTO W/SCOPE: CPT

## 2022-12-05 PROCEDURE — 81015 MICROSCOPIC EXAM OF URINE: CPT

## 2022-12-05 RX ORDER — ALPRAZOLAM 0.5 MG/1
TABLET ORAL
COMMUNITY
Start: 2022-10-31

## 2022-12-07 ENCOUNTER — TELEPHONE (OUTPATIENT)
Dept: INTERNAL MEDICINE CLINIC | Facility: CLINIC | Age: 77
End: 2022-12-07

## 2022-12-07 NOTE — TELEPHONE ENCOUNTER
To nursing -- please tell patient's  (patient has dementia)  Urinalysis and urine culture show no UTI. There is some microscopic blood in the urine. Go ahead with the CT chest abdomen pelvis which will give a view of the kidneys. Thanks. 12/5/2022-UA--6-10 RBC 1-5 WBC. U CX-negative.

## 2022-12-08 ENCOUNTER — HOSPITAL ENCOUNTER (OUTPATIENT)
Dept: CT IMAGING | Facility: HOSPITAL | Age: 77
Discharge: HOME | End: 2022-12-08
Attending: INTERNAL MEDICINE
Payer: MEDICARE

## 2022-12-08 ENCOUNTER — TELEPHONE (OUTPATIENT)
Dept: INTERNAL MEDICINE CLINIC | Facility: CLINIC | Age: 77
End: 2022-12-08

## 2022-12-08 DIAGNOSIS — R63.4 WEIGHT LOSS: ICD-10-CM

## 2022-12-08 PROCEDURE — 74176 CT ABD & PELVIS W/O CONTRAST: CPT | Performed by: INTERNAL MEDICINE

## 2022-12-08 PROCEDURE — 71250 CT THORAX DX C-: CPT | Performed by: INTERNAL MEDICINE

## 2022-12-08 NOTE — TELEPHONE ENCOUNTER
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

## 2022-12-08 NOTE — TELEPHONE ENCOUNTER
Patient returning our call, nurse unavailable at this time. Please call patient back.     Best call back number [phone]

## 2022-12-16 NOTE — TELEPHONE ENCOUNTER
Called and spoke with patient's , Italia Gomes. Relayed MD's message and provided phone number for Dr. Nan Dior. Italia Gomes verbalized understanding. Samples Given: Targadox 50mg and retain-a micro .06% Detail Level: Simple

## 2023-02-06 PROBLEM — I72.3 ANEURYSM OF ILIAC ARTERY (HCC): Status: ACTIVE | Noted: 2023-02-06

## 2023-02-06 PROBLEM — R63.4 WEIGHT LOSS: Status: ACTIVE | Noted: 2023-02-06

## 2023-02-06 PROBLEM — R31.29 MICROSCOPIC HEMATURIA: Status: ACTIVE | Noted: 2023-02-06

## 2023-02-10 ENCOUNTER — HOSPITAL ENCOUNTER (OUTPATIENT)
Dept: CT IMAGING | Facility: HOSPITAL | Age: 78
Discharge: HOME | End: 2023-02-10
Attending: RADIOLOGY
Payer: MEDICARE

## 2023-02-10 DIAGNOSIS — I72.3 ILIAC ANEURYSM (HCC): ICD-10-CM

## 2023-02-10 LAB
CREAT BLD-MCNC: 0.9 MG/DL
GFR SERPLBLD BASED ON 1.73 SQ M-ARVRAT: 65 ML/MIN/1.73M2 (ref 60–?)

## 2023-02-10 PROCEDURE — 82565 ASSAY OF CREATININE: CPT

## 2023-02-10 PROCEDURE — 74174 CTA ABD&PLVS W/CONTRAST: CPT | Performed by: RADIOLOGY

## 2023-02-20 RX ORDER — ROSUVASTATIN CALCIUM 20 MG/1
TABLET, COATED ORAL
Qty: 90 TABLET | Refills: 3 | Status: SHIPPED | OUTPATIENT
Start: 2023-02-20

## 2023-03-08 RX ORDER — ROSUVASTATIN CALCIUM 20 MG/1
TABLET, COATED ORAL
Qty: 30 TABLET | Refills: 0 | OUTPATIENT
Start: 2023-03-08

## 2023-03-08 NOTE — TELEPHONE ENCOUNTER
This was a duplicate request  Please refer to other 2/20 refill encounter. Crestor was sent to pharmacy on 2/20.   Will close this encounter

## 2023-04-05 NOTE — PATIENT INSTRUCTIONS
You were seen in clinic today for 2-month follow-up. Today, we reviewed your CT scan results as there is been progression of the aneurysm of one of the arteries of the leg. - We are planning for IR intervention 4/11/2023 with Dr. Marlin Collado, you may need to have the pre-operative paperwork mailed to your home if you are not able to access through [facility]    There were also abnormalities seen on the CT scan, for which colonoscopy was recommended.  We will request Dr. Fam Bi records  -Please keep a close eye on the weight, keep up with good food and water intake.  -We will keep checking the weights at future visits    Return to clinic in 6-8 weeks for follow-up

## 2023-04-06 ENCOUNTER — OFFICE VISIT (OUTPATIENT)
Dept: INTERNAL MEDICINE CLINIC | Facility: CLINIC | Age: 78
End: 2023-04-06

## 2023-04-06 VITALS
OXYGEN SATURATION: 95 % | WEIGHT: 133.63 LBS | HEART RATE: 84 BPM | BODY MASS INDEX: 21.47 KG/M2 | SYSTOLIC BLOOD PRESSURE: 122 MMHG | DIASTOLIC BLOOD PRESSURE: 72 MMHG | HEIGHT: 66 IN | TEMPERATURE: 98 F

## 2023-04-06 DIAGNOSIS — R31.29 MICROSCOPIC HEMATURIA: ICD-10-CM

## 2023-04-06 DIAGNOSIS — R93.89 ABNORMAL CT SCAN: ICD-10-CM

## 2023-04-06 DIAGNOSIS — G30.1 LATE ONSET ALZHEIMER'S DISEASE WITHOUT BEHAVIORAL DISTURBANCE (HCC): ICD-10-CM

## 2023-04-06 DIAGNOSIS — E78.00 HYPERCHOLESTEROLEMIA: ICD-10-CM

## 2023-04-06 DIAGNOSIS — F02.80 LATE ONSET ALZHEIMER'S DISEASE WITHOUT BEHAVIORAL DISTURBANCE (HCC): ICD-10-CM

## 2023-04-06 DIAGNOSIS — I72.3 ANEURYSM OF ILIAC ARTERY (HCC): Primary | ICD-10-CM

## 2023-04-06 DIAGNOSIS — F41.9 ANXIETY: ICD-10-CM

## 2023-04-06 PROCEDURE — 99214 OFFICE O/P EST MOD 30 MIN: CPT | Performed by: INTERNAL MEDICINE

## 2023-04-06 PROCEDURE — 1126F AMNT PAIN NOTED NONE PRSNT: CPT | Performed by: INTERNAL MEDICINE

## 2023-04-07 RX ORDER — PREDNISONE 50 MG/1
50 TABLET ORAL 3 TIMES DAILY
Qty: 3 TABLET | Refills: 0 | Status: SHIPPED | OUTPATIENT
Start: 2023-04-07 | End: 2023-04-08

## 2023-04-07 RX ORDER — DIPHENHYDRAMINE HCL 25 MG
25 CAPSULE ORAL ONCE
Qty: 2 CAPSULE | Refills: 0 | Status: SHIPPED | OUTPATIENT
Start: 2023-04-07 | End: 2023-04-07

## 2023-04-08 ENCOUNTER — NURSE ONLY (OUTPATIENT)
Dept: LAB | Facility: HOSPITAL | Age: 78
End: 2023-04-08
Attending: RADIOLOGY
Payer: MEDICARE

## 2023-04-08 ENCOUNTER — APPOINTMENT (OUTPATIENT)
Dept: LAB | Facility: HOSPITAL | Age: 78
End: 2023-04-08
Attending: RADIOLOGY
Payer: MEDICARE

## 2023-04-08 DIAGNOSIS — Z01.818 PRE-OP TESTING: ICD-10-CM

## 2023-04-08 LAB
ANION GAP SERPL CALC-SCNC: 3 MMOL/L (ref 0–18)
BASOPHILS # BLD AUTO: 0.05 X10(3) UL (ref 0–0.2)
BASOPHILS NFR BLD AUTO: 1 %
BUN BLD-MCNC: 21 MG/DL (ref 7–18)
BUN/CREAT SERPL: 24.1 (ref 10–20)
CALCIUM BLD-MCNC: 9 MG/DL (ref 8.5–10.1)
CHLORIDE SERPL-SCNC: 108 MMOL/L (ref 98–112)
CO2 SERPL-SCNC: 28 MMOL/L (ref 21–32)
CREAT BLD-MCNC: 0.87 MG/DL
DEPRECATED RDW RBC AUTO: 45.1 FL (ref 35.1–46.3)
EOSINOPHIL # BLD AUTO: 0.2 X10(3) UL (ref 0–0.7)
EOSINOPHIL NFR BLD AUTO: 4.1 %
ERYTHROCYTE [DISTWIDTH] IN BLOOD BY AUTOMATED COUNT: 12.6 % (ref 11–15)
FASTING STATUS PATIENT QL REPORTED: NO
GFR SERPLBLD BASED ON 1.73 SQ M-ARVRAT: 68 ML/MIN/1.73M2 (ref 60–?)
GLUCOSE BLD-MCNC: 96 MG/DL (ref 70–99)
HCT VFR BLD AUTO: 39.3 %
HGB BLD-MCNC: 13 G/DL
IMM GRANULOCYTES # BLD AUTO: 0.02 X10(3) UL (ref 0–1)
IMM GRANULOCYTES NFR BLD: 0.4 %
LYMPHOCYTES # BLD AUTO: 0.87 X10(3) UL (ref 1–4)
LYMPHOCYTES NFR BLD AUTO: 17.6 %
MCH RBC QN AUTO: 32.2 PG (ref 26–34)
MCHC RBC AUTO-ENTMCNC: 33.1 G/DL (ref 31–37)
MCV RBC AUTO: 97.3 FL
MONOCYTES # BLD AUTO: 0.57 X10(3) UL (ref 0.1–1)
MONOCYTES NFR BLD AUTO: 11.6 %
NEUTROPHILS # BLD AUTO: 3.22 X10 (3) UL (ref 1.5–7.7)
NEUTROPHILS # BLD AUTO: 3.22 X10(3) UL (ref 1.5–7.7)
NEUTROPHILS NFR BLD AUTO: 65.3 %
OSMOLALITY SERPL CALC.SUM OF ELEC: 291 MOSM/KG (ref 275–295)
PLATELET # BLD AUTO: 226 10(3)UL (ref 150–450)
POTASSIUM SERPL-SCNC: 4.2 MMOL/L (ref 3.5–5.1)
RBC # BLD AUTO: 4.04 X10(6)UL
SODIUM SERPL-SCNC: 139 MMOL/L (ref 136–145)
WBC # BLD AUTO: 4.9 X10(3) UL (ref 4–11)

## 2023-04-08 PROCEDURE — 36415 COLL VENOUS BLD VENIPUNCTURE: CPT

## 2023-04-08 PROCEDURE — 80048 BASIC METABOLIC PNL TOTAL CA: CPT

## 2023-04-08 PROCEDURE — 85025 COMPLETE CBC W/AUTO DIFF WBC: CPT

## 2023-04-11 ENCOUNTER — HOSPITAL ENCOUNTER (OUTPATIENT)
Dept: INTERVENTIONAL RADIOLOGY/VASCULAR | Facility: HOSPITAL | Age: 78
Discharge: HOME | End: 2023-04-11
Attending: RADIOLOGY | Admitting: RADIOLOGY
Payer: MEDICARE

## 2023-04-11 VITALS
OXYGEN SATURATION: 93 % | WEIGHT: 133 LBS | RESPIRATION RATE: 24 BRPM | DIASTOLIC BLOOD PRESSURE: 74 MMHG | TEMPERATURE: 97 F | SYSTOLIC BLOOD PRESSURE: 121 MMHG | HEART RATE: 88 BPM | BODY MASS INDEX: 21.38 KG/M2 | HEIGHT: 66 IN

## 2023-04-11 DIAGNOSIS — Z01.818 PRE-OP TESTING: Primary | ICD-10-CM

## 2023-04-11 DIAGNOSIS — I72.3 ANEURYSM OF COMMON ILIAC ARTERY (HCC): ICD-10-CM

## 2023-04-11 DIAGNOSIS — I71.40 AAA (ABDOMINAL AORTIC ANEURYSM) (HCC): Primary | ICD-10-CM

## 2023-04-11 DIAGNOSIS — Z88.8 ALLERGY TO IODINE: ICD-10-CM

## 2023-04-11 PROCEDURE — 99153 MOD SED SAME PHYS/QHP EA: CPT | Performed by: RADIOLOGY

## 2023-04-11 PROCEDURE — 04VE3DZ RESTRICTION OF RIGHT INTERNAL ILIAC ARTERY WITH INTRALUMINAL DEVICE, PERCUTANEOUS APPROACH: ICD-10-PCS | Performed by: RADIOLOGY

## 2023-04-11 PROCEDURE — 36415 COLL VENOUS BLD VENIPUNCTURE: CPT

## 2023-04-11 PROCEDURE — 37221 HC TRANSLUMINAL ANGIOPLASTY W STENT ILIAC UNILAT INIT: CPT | Performed by: RADIOLOGY

## 2023-04-11 PROCEDURE — 04V23DZ RESTRICTION OF GASTRIC ARTERY WITH INTRALUMINAL DEVICE, PERCUTANEOUS APPROACH: ICD-10-PCS | Performed by: RADIOLOGY

## 2023-04-11 PROCEDURE — 37223 HC TRANSLUMINAL ANGIOPLASTY W STENT ILIAC EA ADDL: CPT | Performed by: RADIOLOGY

## 2023-04-11 PROCEDURE — 37242 VASC EMBOLIZE/OCCLUDE ARTERY: CPT | Performed by: RADIOLOGY

## 2023-04-11 PROCEDURE — 99152 MOD SED SAME PHYS/QHP 5/>YRS: CPT | Performed by: RADIOLOGY

## 2023-04-11 PROCEDURE — 04VC3DZ RESTRICTION OF RIGHT COMMON ILIAC ARTERY WITH INTRALUMINAL DEVICE, PERCUTANEOUS APPROACH: ICD-10-PCS | Performed by: RADIOLOGY

## 2023-04-11 RX ORDER — CLOPIDOGREL BISULFATE 75 MG/1
75 TABLET ORAL DAILY
Status: DISCONTINUED | OUTPATIENT
Start: 2023-04-12 | End: 2023-04-11

## 2023-04-11 RX ORDER — ACETAMINOPHEN 325 MG/1
TABLET ORAL
Status: COMPLETED
Start: 2023-04-11 | End: 2023-04-11

## 2023-04-11 RX ORDER — LIDOCAINE HYDROCHLORIDE 20 MG/ML
INJECTION, SOLUTION INFILTRATION; PERINEURAL
Status: COMPLETED
Start: 2023-04-11 | End: 2023-04-11

## 2023-04-11 RX ORDER — HEPARIN SODIUM 1000 [USP'U]/ML
INJECTION, SOLUTION INTRAVENOUS; SUBCUTANEOUS
Status: COMPLETED
Start: 2023-04-11 | End: 2023-04-11

## 2023-04-11 RX ORDER — SODIUM CHLORIDE 9 MG/ML
INJECTION, SOLUTION INTRAVENOUS CONTINUOUS
Status: DISCONTINUED | OUTPATIENT
Start: 2023-04-11 | End: 2023-04-11

## 2023-04-11 RX ORDER — CLOPIDOGREL BISULFATE 75 MG/1
TABLET ORAL
Status: COMPLETED
Start: 2023-04-11 | End: 2023-04-11

## 2023-04-11 RX ORDER — SODIUM CHLORIDE 0.9 % (FLUSH) 0.9 %
10 SYRINGE (ML) INJECTION AS NEEDED
Status: DISCONTINUED | OUTPATIENT
Start: 2023-04-11 | End: 2023-04-11

## 2023-04-11 RX ORDER — ACETAMINOPHEN 325 MG/1
650 TABLET ORAL EVERY 6 HOURS PRN
Status: DISCONTINUED | OUTPATIENT
Start: 2023-04-11 | End: 2023-04-11

## 2023-04-11 RX ORDER — PREDNISONE 50 MG/1
50 TABLET ORAL DAILY
Qty: 3 TABLET | Refills: 0 | Status: SHIPPED | OUTPATIENT
Start: 2023-04-11

## 2023-04-11 RX ORDER — CLOPIDOGREL BISULFATE 75 MG/1
75 TABLET ORAL DAILY
Qty: 28 TABLET | Refills: 0 | Status: SHIPPED | OUTPATIENT
Start: 2023-04-12 | End: 2023-04-11

## 2023-04-11 RX ORDER — MIDAZOLAM HYDROCHLORIDE 1 MG/ML
INJECTION INTRAMUSCULAR; INTRAVENOUS
Status: COMPLETED
Start: 2023-04-11 | End: 2023-04-11

## 2023-04-11 RX ADMIN — SODIUM CHLORIDE: 9 INJECTION, SOLUTION INTRAVENOUS at 07:30:00

## 2023-04-11 RX ADMIN — ACETAMINOPHEN 650 MG: 325 TABLET ORAL at 10:43:00

## 2023-04-11 NOTE — IVS NOTE
DISCHARGE NOTE     Pt is able to sit up and ambulate without difficulty. Pt voided and tolerated fluids and food. Procedural site remains dry and intact with good circulation, motion, and sensation. No signs and symptoms of bleeding/hematoma noted. IV access removed  Instruction provided, patient/family verbalizes understanding.  spoke with patient/family post procedure. Pt discharge via wheelchair to [facility]       Follow up Appointment: 04/19 with BALA at 1030 then follow up CT in 6 months    New Prescription: Prednisone ordered for contrast for CT scan; plavix 75mg every day x 4 weeks.

## 2023-04-11 NOTE — INTERVAL H&P NOTE
The above referenced H&P was reviewed by Gina Baptiste MD on 4/11/2023, the patient was examined and no significant changes have occurred in the patient's condition since the H&P was performed. Risks, benefits, alternative treatments and consequences of no treatment were discussed. We will proceed with procedure as planned.       Gina Baptiste MD  4/11/2023  7:25 AM

## 2023-05-17 NOTE — PATIENT INSTRUCTIONS
You were seen in clinic today for follow-up. We are happy to hear that you tolerated your iliac artery repair well and you seem to have recovered well. Your weight has remained stable and you have had good appetite since your procedure. Keep up with good nutrition as well as good exercise    Today, we also focused on:  -Sniffles: This may be due to sinus reaction, possibly the start of allergies given this I allergy season. You may use Flonase/Nasacort 1 spray each nostril in the morning on an as-needed basis. Watch out for nasal dryness  - Right knee pain: Due to osteoarthritis. Keep up with good exercise, stretching as able. He may use Tylenol on an as-needed basis. Try to limit ibuprofen to no more than 1-2 times a day for more severe episodes of pain    Notify us if pain relief is still an issue as we can try alternative medications. You may benefit from orthopedic evaluation, Dr. Cody Nguyen if things do not improve over time.     Return to clinic in 3-4 months for follow-up

## 2023-05-18 ENCOUNTER — OFFICE VISIT (OUTPATIENT)
Dept: INTERNAL MEDICINE CLINIC | Facility: CLINIC | Age: 78
End: 2023-05-18

## 2023-05-18 VITALS
HEART RATE: 77 BPM | WEIGHT: 135 LBS | OXYGEN SATURATION: 99 % | BODY MASS INDEX: 21.69 KG/M2 | TEMPERATURE: 98 F | HEIGHT: 66 IN | SYSTOLIC BLOOD PRESSURE: 132 MMHG | DIASTOLIC BLOOD PRESSURE: 80 MMHG

## 2023-05-18 DIAGNOSIS — G30.1 LATE ONSET ALZHEIMER'S DISEASE WITHOUT BEHAVIORAL DISTURBANCE (HCC): ICD-10-CM

## 2023-05-18 DIAGNOSIS — F02.80 LATE ONSET ALZHEIMER'S DISEASE WITHOUT BEHAVIORAL DISTURBANCE (HCC): ICD-10-CM

## 2023-05-18 DIAGNOSIS — I72.3 ANEURYSM OF ILIAC ARTERY (HCC): Primary | ICD-10-CM

## 2023-05-18 DIAGNOSIS — R92.2 DENSE BREAST: ICD-10-CM

## 2023-05-18 DIAGNOSIS — E78.00 HYPERCHOLESTEROLEMIA: ICD-10-CM

## 2023-05-18 DIAGNOSIS — Z12.31 ENCOUNTER FOR SCREENING MAMMOGRAM FOR MALIGNANT NEOPLASM OF BREAST: ICD-10-CM

## 2023-05-18 DIAGNOSIS — R31.29 MICROSCOPIC HEMATURIA: ICD-10-CM

## 2023-05-18 DIAGNOSIS — F41.9 ANXIETY: ICD-10-CM

## 2023-05-18 PROCEDURE — 99214 OFFICE O/P EST MOD 30 MIN: CPT | Performed by: INTERNAL MEDICINE

## 2023-05-18 PROCEDURE — 1125F AMNT PAIN NOTED PAIN PRSNT: CPT | Performed by: INTERNAL MEDICINE

## 2023-05-19 ENCOUNTER — PATIENT MESSAGE (OUTPATIENT)
Dept: INTERNAL MEDICINE CLINIC | Facility: CLINIC | Age: 78
End: 2023-05-19

## 2023-05-19 RX ORDER — QUETIAPINE FUMARATE 25 MG/1
25 TABLET, FILM COATED ORAL NIGHTLY
Qty: 90 TABLET | Refills: 1 | Status: SHIPPED | OUTPATIENT
Start: 2023-05-19

## 2023-05-19 NOTE — TELEPHONE ENCOUNTER
To Dr. Ethan Zimmerman Given with Juanis Sam ([place] per HIPPA) regarding MyChart msg. Juanis Sam denies any safety concerns for [facility] or himself. [facility] has not had and suicidal or homicidal ideation. Notified Juanis Sam his message would be passed along to physician, but advised Juanis Sam to call back with any questions/concerns/worsening symptoms. Leidy Gamboa to call 911 if there are any safety concerns. Juanis Sam verbalized understanding.

## 2023-05-30 ENCOUNTER — TELEPHONE (OUTPATIENT)
Dept: INTERNAL MEDICINE CLINIC | Facility: CLINIC | Age: 78
End: 2023-05-30

## 2023-05-30 ENCOUNTER — PATIENT MESSAGE (OUTPATIENT)
Dept: INTERNAL MEDICINE CLINIC | Facility: CLINIC | Age: 78
End: 2023-05-30

## 2023-05-30 NOTE — TELEPHONE ENCOUNTER
From: Alan Hein  To: Phi Perera MD  Sent: 5/30/2023 10:39 AM CDT  Subject: Quetiapine    You prescribed this medication for Roscoe Deutsch. I looked this up on line and found that it is a antipsychotic drug used to treat bipolar disorders and schizofrenia. Under the heading Who should not take this drug I found the following, \"This drug may cause death in older persons with dementia. \" A few months ago, I asked Dr. Wade Silvestre about anti-psychotics and she told me the same thing. She said she wouldn't recommend taking them. Naturally I'm concerned.

## 2023-05-30 NOTE — TELEPHONE ENCOUNTER
----- Message from [facility]. Sandy De Souza sent at 5/30/2023 10:39 AM CDT -----  Regarding: Quetiapine  Contact: [phone]  You prescribed this medication for Lizzeth Corona. I looked this up on line and found that it is a antipsychotic drug used to treat bipolar disorders and schizofrenia. Under the heading Who should not take this drug I found the following, \"This drug may cause death in older persons with dementia. \"  A few months ago, I asked Dr. Vanesa Campoverde about anti-psychotics and she told me the same thing. She said she wouldn't recommend taking them. Naturally I'm concerned.

## 2023-06-07 ENCOUNTER — PATIENT MESSAGE (OUTPATIENT)
Dept: INTERNAL MEDICINE CLINIC | Facility: CLINIC | Age: 78
End: 2023-06-07

## 2023-06-07 RX ORDER — ALPRAZOLAM 0.25 MG/1
0.25 TABLET ORAL 2 TIMES DAILY PRN
Qty: 60 TABLET | Refills: 1 | Status: SHIPPED | OUTPATIENT
Start: 2023-06-07

## 2023-06-07 NOTE — TELEPHONE ENCOUNTER
From: Chavez Fried  To: Guera Ames MD  Sent: 6/7/2023 8:09 AM CDT  Subject: Follow-up to May 30 Message    I sent a message to you on MAY 30 and have not heard from you. Please let me know what you think.

## 2023-06-07 NOTE — TELEPHONE ENCOUNTER
Spoke with Dawit Mendez - still about the same. Has not used the medication. She used it for 5 days     Symptoms now include talking about things that she has delusional types of symptoms; paranoid. She gets upset if those things are not happening - can get confrontational with Dawit Mendez and daughters    She does get some depression symptoms thinking about things that are going on. Has tolerated Lorazepam in the past. We can try alprazolam lowest does BID PRN only during times of agitation. Start 0.25 mg only during times of agitation. We did discuss risk for falls, over sedation. She has tolerated this in the past, but Dawit Mendez will keep a close eye on it. He will keep us updated on how things are going.

## 2023-09-20 ENCOUNTER — OFFICE VISIT (OUTPATIENT)
Dept: INTERNAL MEDICINE CLINIC | Facility: CLINIC | Age: 78
End: 2023-09-20

## 2023-09-20 VITALS
BODY MASS INDEX: 22.04 KG/M2 | WEIGHT: 137.13 LBS | SYSTOLIC BLOOD PRESSURE: 124 MMHG | DIASTOLIC BLOOD PRESSURE: 62 MMHG | HEART RATE: 90 BPM | OXYGEN SATURATION: 98 % | HEIGHT: 66 IN

## 2023-09-20 DIAGNOSIS — F02.818 LATE ONSET ALZHEIMER'S DEMENTIA WITH BEHAVIORAL DISTURBANCE (HCC): ICD-10-CM

## 2023-09-20 DIAGNOSIS — E78.00 HYPERCHOLESTEROLEMIA: Primary | ICD-10-CM

## 2023-09-20 DIAGNOSIS — R63.4 WEIGHT LOSS: ICD-10-CM

## 2023-09-20 DIAGNOSIS — Z12.31 SCREENING MAMMOGRAM FOR BREAST CANCER: ICD-10-CM

## 2023-09-20 DIAGNOSIS — G30.1 LATE ONSET ALZHEIMER'S DEMENTIA WITH BEHAVIORAL DISTURBANCE (HCC): ICD-10-CM

## 2023-09-20 DIAGNOSIS — D32.9 MENINGIOMA (HCC): ICD-10-CM

## 2023-09-20 DIAGNOSIS — F22 DELUSIONAL THOUGHTS (HCC): ICD-10-CM

## 2023-09-20 DIAGNOSIS — E55.9 VITAMIN D DEFICIENCY: ICD-10-CM

## 2023-09-20 PROCEDURE — 99214 OFFICE O/P EST MOD 30 MIN: CPT | Performed by: INTERNAL MEDICINE

## 2023-10-06 ENCOUNTER — HOSPITAL ENCOUNTER (OUTPATIENT)
Dept: MAMMOGRAPHY | Age: 78
Discharge: HOME | End: 2023-10-06
Attending: INTERNAL MEDICINE
Payer: MEDICARE

## 2023-10-06 DIAGNOSIS — Z12.31 SCREENING MAMMOGRAM FOR BREAST CANCER: ICD-10-CM

## 2023-10-06 PROCEDURE — 77063 BREAST TOMOSYNTHESIS BI: CPT | Performed by: INTERNAL MEDICINE

## 2023-10-06 PROCEDURE — 77067 SCR MAMMO BI INCL CAD: CPT | Performed by: INTERNAL MEDICINE

## 2023-10-12 ENCOUNTER — HOSPITAL ENCOUNTER (OUTPATIENT)
Dept: CT IMAGING | Facility: HOSPITAL | Age: 78
Discharge: HOME | End: 2023-10-12
Attending: RADIOLOGY
Payer: MEDICARE

## 2023-10-12 DIAGNOSIS — I71.40 AAA (ABDOMINAL AORTIC ANEURYSM) (HCC): ICD-10-CM

## 2023-10-12 LAB
CREAT BLD-MCNC: 1.1 MG/DL
EGFRCR SERPLBLD CKD-EPI 2021: 51 ML/MIN/1.73M2 (ref 60–?)

## 2023-10-12 PROCEDURE — 74174 CTA ABD&PLVS W/CONTRAST: CPT | Performed by: RADIOLOGY

## 2023-10-12 PROCEDURE — 82565 ASSAY OF CREATININE: CPT

## 2023-10-12 RX ORDER — IOHEXOL 350 MG/ML
80 INJECTION, SOLUTION INTRAVENOUS
Status: COMPLETED | OUTPATIENT
Start: 2023-10-12 | End: 2023-10-12

## 2023-10-12 RX ADMIN — IOHEXOL 80 ML: 350 INJECTION, SOLUTION INTRAVENOUS at 13:43:00

## 2023-11-08 ENCOUNTER — TELEPHONE (OUTPATIENT)
Dept: INTERNAL MEDICINE CLINIC | Facility: CLINIC | Age: 78
End: 2023-11-08

## 2024-04-17 ENCOUNTER — LAB ENCOUNTER (OUTPATIENT)
Dept: LAB | Facility: HOSPITAL | Age: 79
End: 2024-04-17
Attending: INTERNAL MEDICINE
Payer: MEDICARE

## 2024-04-17 DIAGNOSIS — F02.818 LATE ONSET ALZHEIMER'S DEMENTIA WITH BEHAVIORAL DISTURBANCE (HCC): ICD-10-CM

## 2024-04-17 DIAGNOSIS — R63.4 WEIGHT LOSS: ICD-10-CM

## 2024-04-17 DIAGNOSIS — E78.00 HYPERCHOLESTEROLEMIA: ICD-10-CM

## 2024-04-17 DIAGNOSIS — G30.1 LATE ONSET ALZHEIMER'S DEMENTIA WITH BEHAVIORAL DISTURBANCE (HCC): ICD-10-CM

## 2024-04-17 DIAGNOSIS — E55.9 VITAMIN D DEFICIENCY: ICD-10-CM

## 2024-04-17 LAB
BILIRUB UR QL: NEGATIVE
CHOLEST SERPL-MCNC: 274 MG/DL (ref ?–200)
FASTING PATIENT LIPID ANSWER: NO
GLUCOSE UR-MCNC: NORMAL MG/DL
HDLC SERPL-MCNC: 71 MG/DL (ref 40–59)
KETONES UR-MCNC: NEGATIVE MG/DL
LDLC SERPL CALC-MCNC: 188 MG/DL (ref ?–100)
LEUKOCYTE ESTERASE UR QL STRIP.AUTO: 500
NITRITE UR QL STRIP.AUTO: NEGATIVE
NONHDLC SERPL-MCNC: 203 MG/DL (ref ?–130)
PH UR: 6.5 [PH] (ref 5–8)
PROT UR-MCNC: 20 MG/DL
SP GR UR STRIP: 1.01 (ref 1–1.03)
T4 FREE SERPL-MCNC: 1.1 NG/DL (ref 0.8–1.7)
TRIGL SERPL-MCNC: 89 MG/DL (ref 30–149)
TSI SER-ACNC: 5.23 MIU/ML (ref 0.55–4.78)
UROBILINOGEN UR STRIP-ACNC: NORMAL
VIT B12 SERPL-MCNC: 517 PG/ML (ref 211–911)
VIT D+METAB SERPL-MCNC: 20.9 NG/ML (ref 30–100)
VLDLC SERPL CALC-MCNC: 18 MG/DL (ref 0–30)

## 2024-04-17 PROCEDURE — 84439 ASSAY OF FREE THYROXINE: CPT

## 2024-04-17 PROCEDURE — 82306 VITAMIN D 25 HYDROXY: CPT

## 2024-04-17 PROCEDURE — 87086 URINE CULTURE/COLONY COUNT: CPT

## 2024-04-17 PROCEDURE — 84443 ASSAY THYROID STIM HORMONE: CPT

## 2024-04-17 PROCEDURE — 80061 LIPID PANEL: CPT

## 2024-04-17 PROCEDURE — 82607 VITAMIN B-12: CPT

## 2024-04-17 PROCEDURE — 81001 URINALYSIS AUTO W/SCOPE: CPT

## 2024-04-17 PROCEDURE — 36415 COLL VENOUS BLD VENIPUNCTURE: CPT

## 2024-04-23 ENCOUNTER — TELEPHONE (OUTPATIENT)
Dept: INTERNAL MEDICINE CLINIC | Facility: CLINIC | Age: 79
End: 2024-04-23

## 2024-04-23 DIAGNOSIS — R31.9 HEMATURIA, UNSPECIFIED TYPE: Primary | ICD-10-CM

## 2024-06-13 ENCOUNTER — PATIENT MESSAGE (OUTPATIENT)
Dept: INTERNAL MEDICINE CLINIC | Facility: CLINIC | Age: 79
End: 2024-06-13

## 2024-06-14 NOTE — TELEPHONE ENCOUNTER
From: Lucy Samuelred  To: Bre Cifuentes  Sent: 6/13/2024 12:14 PM CDT  Subject: Upcoming visit on June 19.    We are considering memory care for Lucy Cm. The facility requires a physical exam and a health history from her doctor. They also require a TB test. I just wanted you to know ahead of time. She does not know about any of this yet.

## 2024-06-19 ENCOUNTER — OFFICE VISIT (OUTPATIENT)
Dept: INTERNAL MEDICINE CLINIC | Facility: CLINIC | Age: 79
End: 2024-06-19

## 2024-06-19 ENCOUNTER — LAB ENCOUNTER (OUTPATIENT)
Dept: LAB | Age: 79
End: 2024-06-19
Attending: INTERNAL MEDICINE

## 2024-06-19 VITALS
DIASTOLIC BLOOD PRESSURE: 80 MMHG | HEART RATE: 84 BPM | BODY MASS INDEX: 23.3 KG/M2 | OXYGEN SATURATION: 97 % | WEIGHT: 145 LBS | SYSTOLIC BLOOD PRESSURE: 126 MMHG | HEIGHT: 66 IN

## 2024-06-19 DIAGNOSIS — Z12.31 ENCOUNTER FOR SCREENING MAMMOGRAM FOR MALIGNANT NEOPLASM OF BREAST: ICD-10-CM

## 2024-06-19 DIAGNOSIS — E55.9 VITAMIN D DEFICIENCY: ICD-10-CM

## 2024-06-19 DIAGNOSIS — Z11.1 SCREENING FOR TUBERCULOSIS: ICD-10-CM

## 2024-06-19 DIAGNOSIS — Z00.00 MEDICARE ANNUAL WELLNESS VISIT, SUBSEQUENT: Primary | ICD-10-CM

## 2024-06-19 DIAGNOSIS — Z00.00 MEDICARE ANNUAL WELLNESS VISIT, SUBSEQUENT: ICD-10-CM

## 2024-06-19 LAB
ALBUMIN SERPL-MCNC: 4.2 G/DL (ref 3.2–4.8)
ALBUMIN/GLOB SERPL: 1.4 {RATIO} (ref 1–2)
ALP LIVER SERPL-CCNC: 64 U/L
ALT SERPL-CCNC: 9 U/L
ANION GAP SERPL CALC-SCNC: 4 MMOL/L (ref 0–18)
AST SERPL-CCNC: 17 U/L (ref ?–34)
BASOPHILS # BLD AUTO: 0.06 X10(3) UL (ref 0–0.2)
BASOPHILS NFR BLD AUTO: 1.2 %
BILIRUB SERPL-MCNC: 0.8 MG/DL (ref 0.2–1.1)
BUN BLD-MCNC: 16 MG/DL (ref 9–23)
BUN/CREAT SERPL: 16.3 (ref 10–20)
CALCIUM BLD-MCNC: 9.2 MG/DL (ref 8.7–10.4)
CHLORIDE SERPL-SCNC: 110 MMOL/L (ref 98–112)
CHOLEST SERPL-MCNC: 276 MG/DL (ref ?–200)
CO2 SERPL-SCNC: 27 MMOL/L (ref 21–32)
CREAT BLD-MCNC: 0.98 MG/DL
DEPRECATED RDW RBC AUTO: 43.8 FL (ref 35.1–46.3)
EGFRCR SERPLBLD CKD-EPI 2021: 59 ML/MIN/1.73M2 (ref 60–?)
EOSINOPHIL # BLD AUTO: 0.13 X10(3) UL (ref 0–0.7)
EOSINOPHIL NFR BLD AUTO: 2.5 %
ERYTHROCYTE [DISTWIDTH] IN BLOOD BY AUTOMATED COUNT: 12.6 % (ref 11–15)
FASTING PATIENT LIPID ANSWER: NO
FASTING STATUS PATIENT QL REPORTED: NO
GLOBULIN PLAS-MCNC: 3 G/DL (ref 2–3.5)
GLUCOSE BLD-MCNC: 96 MG/DL (ref 70–99)
HCT VFR BLD AUTO: 40.9 %
HDLC SERPL-MCNC: 72 MG/DL (ref 40–59)
HGB BLD-MCNC: 14.1 G/DL
IMM GRANULOCYTES # BLD AUTO: 0.02 X10(3) UL (ref 0–1)
IMM GRANULOCYTES NFR BLD: 0.4 %
LDLC SERPL CALC-MCNC: 188 MG/DL (ref ?–100)
LYMPHOCYTES # BLD AUTO: 0.96 X10(3) UL (ref 1–4)
LYMPHOCYTES NFR BLD AUTO: 18.4 %
MCH RBC QN AUTO: 33.2 PG (ref 26–34)
MCHC RBC AUTO-ENTMCNC: 34.5 G/DL (ref 31–37)
MCV RBC AUTO: 96.2 FL
MONOCYTES # BLD AUTO: 0.45 X10(3) UL (ref 0.1–1)
MONOCYTES NFR BLD AUTO: 8.6 %
NEUTROPHILS # BLD AUTO: 3.59 X10 (3) UL (ref 1.5–7.7)
NEUTROPHILS # BLD AUTO: 3.59 X10(3) UL (ref 1.5–7.7)
NEUTROPHILS NFR BLD AUTO: 68.9 %
NONHDLC SERPL-MCNC: 204 MG/DL (ref ?–130)
OSMOLALITY SERPL CALC.SUM OF ELEC: 293 MOSM/KG (ref 275–295)
PLATELET # BLD AUTO: 234 10(3)UL (ref 150–450)
POTASSIUM SERPL-SCNC: 4.2 MMOL/L (ref 3.5–5.1)
PROT SERPL-MCNC: 7.2 G/DL (ref 5.7–8.2)
RBC # BLD AUTO: 4.25 X10(6)UL
SODIUM SERPL-SCNC: 141 MMOL/L (ref 136–145)
TRIGL SERPL-MCNC: 96 MG/DL (ref 30–149)
TSI SER-ACNC: 3.39 MIU/ML (ref 0.55–4.78)
VIT D+METAB SERPL-MCNC: 18.5 NG/ML (ref 30–100)
VLDLC SERPL CALC-MCNC: 20 MG/DL (ref 0–30)
WBC # BLD AUTO: 5.2 X10(3) UL (ref 4–11)

## 2024-06-19 PROCEDURE — 86480 TB TEST CELL IMMUN MEASURE: CPT

## 2024-06-19 PROCEDURE — 80061 LIPID PANEL: CPT

## 2024-06-19 PROCEDURE — 80053 COMPREHEN METABOLIC PANEL: CPT

## 2024-06-19 PROCEDURE — G0439 PPPS, SUBSEQ VISIT: HCPCS | Performed by: INTERNAL MEDICINE

## 2024-06-19 PROCEDURE — 82306 VITAMIN D 25 HYDROXY: CPT

## 2024-06-19 PROCEDURE — 36415 COLL VENOUS BLD VENIPUNCTURE: CPT

## 2024-06-19 PROCEDURE — 99215 OFFICE O/P EST HI 40 MIN: CPT | Performed by: INTERNAL MEDICINE

## 2024-06-19 PROCEDURE — 84443 ASSAY THYROID STIM HORMONE: CPT

## 2024-06-19 PROCEDURE — 85025 COMPLETE CBC W/AUTO DIFF WBC: CPT

## 2024-06-19 NOTE — PROGRESS NOTES
Subjective:   Lucy Enamorado is a 78 year old female who presents for a Medicare Subsequent Annual Wellness visit (Pt already had Initial Annual Wellness) and scheduled follow up of multiple significant but stable problems.     Accompanied by her .    LOV 9/20/23.    Since, she has been doing okay.     Denies complaints or concerns today.    History/Other:   Fall Risk Assessment:   She has been screened for Falls and is low risk.      Cognitive Assessment:   She had a completely normal cognitive assessment - see flowsheet entries     Functional Ability/Status:   Lucy Enamorado has some abnormal functions as listed below:  She has Driving difficulties based on screening of functional status. She has Meal Preparation difficulties based on screening of functional status.She has difficulties Managing Money/Bills based on screening of functional status.She has difficulties Shopping for Groceries based on screening of functional status. She has difficulties Taking Meds as Rx'd based on screening of functional status. She has Hearing problems based on screening of functional status.She has problems with Memory based on screening of functional status.     Depression Screening (PHQ-2/PHQ-9): PHQ-2 SCORE: 0  , done 6/19/2024   Last [place] Suicide Screening on 6/19/2024 was No Risk.     5 minutes spent screening and counseling for depression    Advanced Directives:   She does have a Living Will but we do NOT have it on file in Epic.    She has a Power of  for Health Care on file in [facility].  Patient has Advance Care Planning documents present in EMR. Reviewed documents with patient (and family/surrogate if present).      Patient Active Problem List   Diagnosis    Aneurysm of splenic artery (HCC)    Vitamin D deficiency    Late onset Alzheimer's dementia with behavioral disturbance (HCC)    Anxiety    Hypercholesterolemia    Aneurysm of iliac artery (HCC)    Microscopic hematuria    Weight loss    Delusional  thoughts (HCC)    Meningioma (HCC)     Allergies:  She is allergic to aricept [donepezil], atorvastatin calcium-polysorbate 80, namenda [memantine], penicillins, and radiology contrast iodinated dyes.    Current Medications:  Outpatient Medications Marked as Taking for the 6/19/24 encounter (Office Visit) with Bre Cifuentes, DO   Medication Sig    ALPRAZolam 0.25 MG Oral Tab Take 1 tablet (0.25 mg total) by mouth 2 (two) times daily as needed.    ROSUVASTATIN 20 MG Oral Tab TAKE 1 TABLET BY MOUTH EVERY EVENING    citalopram 20 MG Oral Tab Take 1 tablet (20 mg total) by mouth daily.    Vitamin D3, Cholecalciferol, 25 MCG (1000 UT) Oral Tab Take 3 tablets (3,000 Units total) by mouth daily.     Medical History:  She  has a past medical history of Alzheimer's dementia (HCC), Anxiety, Anxiety and depression, Arthritis, Asthma, exercise induced (HCC), Cluster headaches (1993), Depression, Gallstones, H/O colonoscopy (2011), Hyperlipidemia, Meningioma ([facility]) (2/4/16), Microscopic hematuria (2005, 2011, 2019), Osteoarthritis, Osteopenia (2008), Otitis media of right ear (2013), Shingles (2011), Splenic artery aneurysm (HCC) (2007), and Vitamin D deficiency (2013).    Surgical History:  She  has a past surgical history that includes knee arthroscopy (Right, 2009) and knee arthroscopy (Right, May 2016).     Family History:  Her family history includes 2 daughters in an other family member; 3 siblings in an other family member; Blindness in her mother; Breast Cancer in her maternal grandmother; Dementia (age of onset: 92) in her mother; Depression in her mother; Hypertension in her mother; Neurological Disorder in her father; diverticulitis in her mother.    Social History:  She  reports that she has never smoked. She has never used smokeless tobacco. She reports current alcohol use of about 2.0 - 3.0 standard drinks of alcohol per week. She reports that she does not use drugs.    Tobacco:  She has never smoked  tobacco.    CAGE Alcohol Screen:   CAGE screening score of 0 on 6/19/2024, showing low risk of alcohol abuse.      Patient Care Team:  Bre Cifuentes DO as PCP - General  Jus Bo MD as Consulting Physician (NEUROLOGY)    Review of Systems  GENERAL: feels well otherwise  SKIN: denies any unusual skin lesions  EYES: denies blurred vision or double vision  HEENT: denies nasal congestion, sinus pain or ST  LUNGS: denies shortness of breath with exertion  CARDIOVASCULAR: denies chest pain on exertion  GI: denies abdominal pain, denies heartburn  : denies dysuria, hematuria  MUSCULOSKELETAL: denies back pain  NEURO: denies headaches  PSYCHE: denies depression     Objective:   Physical Exam  General Appearance:  Alert, cooperative, no distress, appears stated age   Head:  Normocephalic, without obvious abnormality, atraumatic   Eyes:  PERRL, conjunctiva/corneas clear, EOM's intact both eyes   Ears:  Normal TM's and external ear canals, both ears   Nose: Nares normal, septum midline,mucosa normal, no drainage or sinus tenderness   Throat: Lips, mucosa, and tongue normal; teeth and gums normal   Neck: Supple, symmetrical, trachea midline, no adenopathy;  thyroid: not enlarged, symmetric, no tenderness/mass/nodules; no carotid bruit or JVD   Breast:   No palpable masses, skin dimpling, nipple retraction, or axillary LAD b/l   Lungs:   Clear to auscultation bilaterally, respirations unlabored   Heart:  Regular rate and rhythm, S1 and S2 normal, no murmur, rub, or gallop   Abdomen:   Soft, non-tender, bowel sounds active all four quadrants,  no masses, no organomegaly   Pelvic: Deferred   Extremities: Extremities normal, atraumatic, no cyanosis or edema   Pulses: 2+ and symmetric   Skin: Skin color, texture, turgor normal, no rashes or lesions   Lymph nodes: Cervical, supraclavicular, and axillary nodes normal   Neurologic: Normal       /80   Pulse 84   Ht 5' 6\" (1.676 m)   Wt 145 lb (65.8 kg)   SpO2 97%    BMI 23.40 kg/m²  Estimated body mass index is 23.4 kg/m² as calculated from the following:    Height as of this encounter: 5' 6\" (1.676 m).    Weight as of this encounter: 145 lb (65.8 kg).    Medicare Hearing Assessment:   Hearing Screening    Screening Method: Whisper Test  Whisper Test Result: Pass               Assessment & Plan:   Lucy Enamorado is a 78 year old female who presents for a Medicare Assessment.     Medicare annual wellness visit, subsequent (Primary)  -     CBC With Differential With Platelet; Future; Expected date: 06/19/2024  -     Comp Metabolic Panel (14); Future; Expected date: 06/19/2024  -     TSH W Reflex To Free T4; Future; Expected date: 06/19/2024  -     Lipid Panel; Future; Expected date: 06/19/2024    Vitamin D deficiency  -     Vitamin D; Future; Expected date: 06/19/2024  Screening for tuberculosis  -     Quantiferon TB Plus; Future; Expected date: 06/19/2024    Encounter for screening mammogram for malignant neoplasm of breast  -     [facility] 2D+3D SCREENING BILAT (CPT=77067/41545); Future; Expected date: 06/19/2024    Late onset Alzheimer's dementia with behavioral disturbance (HCC)  Anxiety  - Progressing per . Having delusions and is intermittently physically aggressive.   - Previously referred to neurologist, encouraged follow up.  - On Celexa 20 mg daily however non-compliant  - Last saw Dr Skaggs's 10/4/22.    - She did MRI brain - ok on 10/14/22. hx intol of aricept (dizziness) and namenda.   - Exelon pills gave HAs.   - Exelon patch gave a rash.   - patient's  plans to move patient into assisted living facility     Microscopic hematuria  - E coli UTI-[facility] 11/10/22- Rx'd bactrim.   - No further gross blood in urine, but has microscopic blood in urine.  - 12/5/2022-UA--6-10 RBC 1-5 WBC. U CX-negative.  - CT chest abd pelvis - 12/8/22 - kidneys no calculi.   - Saw urologist Dr Puentes 2/7/23.  .  Weight loss  - Lost ~40# in a yr. likely from  advancing alzheimers dz and high activity level.   - Increase dietary protein, protein supplements.  - CT chest abd pelvis-12/8/22---no evidence malignancy within the limits of noncontrast CT      R internal iliac artery aneurysm  - CT chest abd pelvis--12/8/22 - Aneurysm proximal right internal iliac artery increased from 2005 now 3.5 cm previously 2.2 cm. Stable rim calcified aneurysm distal splenic artery 17 mm.   - Saw Dr. Koehler 1/20/23--R internal iliac artery aneurysm at size with risk of rupture and he discussed repair. He ord CTA abd pelvis to be done 2/10/23  - Saw Dr. Motta at [facility] in the past for splenic artery aneurysm.  - CTA abdomen and pelvis 2/10: 3.8 cm partially thrombosed aneurysm of the right common iliac artery, 1.6 cm aneurysm of left common iliac artery, focal 1.1 cm aneurysm of the distal SMA with focal dissection with no thrombosis or high-grade narrowing.  -S/p iliac aneurysm repair with Dr. Koehler 4/11/2023; recent IR clinic visit 4/19 with MARK Mcmillan. Has overall healed well.  - last scan CTA A/P 10/12/23, continue f/u with vascular/IR, repeat scan scheduled for 10/2024     Hypercholesterolemia  - Crestor 20 mg daily.  on 4/17/24  - Hx lipitor caused leg pain, pravastatin not effective  - patient doesn't cooperate to take medications     DJD R knee  - X-ray R knee-9/15/22: mild-moderate OA, tricompartmental OA.   - Adv'd ortho Dr. Garibay     Osteopenia  Vitamin D deficiency  - Vit D 3000 u/d  - doesn't take supplements regularly     Meningioma  - CT head 2/4/16 in [place]: suspect calcif meningioma L parietal 2 cm. Dr. Bo.  - CT head 10/31/18 - 2 x 0.5 cm left parafalcine meningioma vs focal ossification of the falx.   - MRI brain 10/14/22 w/Dr. Francisco meningioma \"not reliably identified\".      Hx microhematuria  - UA 12/21/19 6 rbc's.  - Dr. Puentes 2/12/19 -> US kidneys 3/8/19 -0.33 cm nonobstructive stone L kidney.   - Cysto 2/2019 per pt/ ok and was to ret in 1 yr.   -  Hx microhematuria 2005, 2011 Dr. Angulo- IVP/cysto neg 2005, US cysto neg in 2011.     Healthcare Maintenance  Cancer Screenings:  - Breast: mammogram birads 1 10/6/23, repeat ordered  - Cervical: age >65, last seen by gyn 6/6/22  - Colon: colonoscopy 9/22/16 w/Dr. Westfall in [place]. Repeat 2/2022 per  report, no further colonoscopy needed.     Vaccines:  - Tdap: 7/12/18  - Shingles: shingrix recommended  - Pneumonia: pneumovax 12/21/21  - Flu: recommend yearly  - COVID-19: declined booster    Misc:  - DEXA: 2011 w/mild osteopenia, doesn't take vitamin D regularly  - Advanced directives: discussed, has POA paperwork in [facility]    RTC as previously scheduled or sooner PRN.    Labs ordered as above. Informed patient to expect messaging only if the labs are abnormal via patient preferred method of communication (MyChart).    For E/M code - 60 minutes spent reviewing performing chart review, obtaining a history, performing a physical exam, reviewing the assessment/plan, placing orders, and completing documentation.     The patient indicates understanding of these issues and agrees to the plan.  Reinforced healthy diet, lifestyle, and exercise.      No follow-ups on file.     Bre Cifuentes DO, 6/19/2024     Supplementary Documentation:   General Health:  In the past six months, have you lost more than 10 pounds without trying?: 2 - No  Has your appetite been poor?: No  Type of Diet: Balanced  How does the patient maintain a good energy level?: Daily Walks  How would you describe your daily physical activity?: Moderate  How would you describe your current health state?: Good  How do you maintain positive mental well-being?: Puzzles  On a scale of 0 to 10, with 0 being no pain and 10 being severe pain, what is your pain level?: 0 - (None)  In the past six months, have you experienced urine leakage?: 0-No  At any time do you feel concerned for the safety/well-being of yourself and/or your children, in your home  or elsewhere?: No  Have you had any immunizations at another office such as Influenza, Hepatitis B, Tetanus, or Pneumococcal?: No       Lucy Enamorado's SCREENING SCHEDULE   Tests on this list are recommended by your physician but may not be covered, or covered at this frequency, by your insurer.   Please check with your insurance carrier before scheduling to verify coverage.   PREVENTATIVE SERVICES FREQUENCY &  COVERAGE DETAILS LAST COMPLETION DATE   Diabetes Screening    Fasting Blood Sugar /  Glucose    One screening every 12 months if never tested or if previously tested but not diagnosed with pre-diabetes   One screening every 6 months if diagnosed with pre-diabetes Lab Results   Component Value Date    GLU 96 06/19/2024        Cardiovascular Disease Screening    Lipid Panel  Cholesterol  Lipoprotein (HDL)  Triglycerides Covered every 5 years for all Medicare beneficiaries without apparent signs or symptoms of cardiovascular disease Lab Results   Component Value Date    CHOLEST 276 (H) 06/19/2024    HDL 72 (H) 06/19/2024     (H) 06/19/2024    TRIG 96 06/19/2024         Electrocardiogram (EKG)   Covered if needed at Welcome to Medicare, and non-screening if indicated for medical reasons -      Ultrasound Screening for Abdominal Aortic Aneurysm (AAA) Covered once in a lifetime for one of the following risk factors    Men who are 65-75 years old and have ever smoked    Anyone with a family history -     Colorectal Cancer Screening  Covered for ages 50-85; only need ONE of the following:    Colonoscopy   Covered every 10 years    Covered every 2 years if patient is at high risk or previous colonoscopy was abnormal 09/22/2016    No recommendations at this time    Flexible Sigmoidoscopy   Covered every 4 years -    Fecal Occult Blood Test Covered annually -   Bone Density Screening    Bone density screening    Covered every 2 years after age 65 if diagnosed with risk of osteoporosis or estrogen  deficiency.    Covered yearly for long-term glucocorticoid medication use (Steroids) Last Dexa Scan:     11/21/2011      No recommendations at this time   Pap and Pelvic    Pap   Covered every 2 years for women at normal risk; Annually if at high risk -  No recommendations at this time    Chlamydia Annually if high risk -  No recommendations at this time   Screening Mammogram    Mammogram     Recommend annually for all female patients aged 40 and older    One baseline mammogram covered for patients aged 35-39 10/06/2023    Health Maintenance   Topic Date Due    Mammogram  10/06/2024       Immunizations    Influenza Covered once per flu season  Please get every year -  No recommendations at this time    Pneumococcal Each vaccine ([id number] & [id number]) covered once after 65 Prevnar 13: 12/03/2014    [id number]: 12/21/2021     No recommendations at this time    Hepatitis B One screening covered for patients with certain risk factors   -  No recommendations at this time    Tetanus Toxoid Not covered by Medicare Part B unless medically necessary (cut with metal); may be covered with your pharmacy prescription benefits 07/12/2018    Tetanus, Diptheria and Pertusis TD and TDaP Not covered by Medicare Part B -  No recommendations at this time    Zoster Not covered by Medicare Part B; may be covered with your pharmacy  prescription benefits -  Zoster Vaccines(1 of 2) Never done

## 2024-06-21 ENCOUNTER — TELEPHONE (OUTPATIENT)
Dept: INTERNAL MEDICINE CLINIC | Facility: CLINIC | Age: 79
End: 2024-06-21

## 2024-06-21 LAB
M TB IFN-G CD4+ T-CELLS BLD-ACNC: 0.03 IU/ML
M TB TUBERC IFN-G BLD QL: NEGATIVE
M TB TUBERC IGNF/MITOGEN IGNF CONTROL: 2.91 IU/ML
QFT TB1 AG MINUS NIL: 0.01 IU/ML
QFT TB2 AG MINUS NIL: 0.01 IU/ML

## 2024-06-21 NOTE — TELEPHONE ENCOUNTER
Amos sent for lab results.    To RN staff: please fax signed [facility] application form along with office note from 6/19 and Quant Gold 6/19 to [phone] Attn: Director of Nursing Issac Cardenas. (Form in outbox for reference).    Thank you!

## 2024-06-24 ENCOUNTER — APPOINTMENT (OUTPATIENT)
Dept: OBGYN | Age: 79
End: 2024-06-24

## 2024-06-24 VITALS
WEIGHT: 144 LBS | BODY MASS INDEX: 23.14 KG/M2 | SYSTOLIC BLOOD PRESSURE: 154 MMHG | HEART RATE: 94 BPM | HEIGHT: 66 IN | DIASTOLIC BLOOD PRESSURE: 89 MMHG | TEMPERATURE: 98.6 F | OXYGEN SATURATION: 97 %

## 2024-06-24 DIAGNOSIS — Z01.419 GYNECOLOGIC EXAM NORMAL: Primary | ICD-10-CM

## 2024-06-24 DIAGNOSIS — Z11.51 SCREENING FOR HPV (HUMAN PAPILLOMAVIRUS): ICD-10-CM

## 2024-06-24 ASSESSMENT — PATIENT HEALTH QUESTIONNAIRE - PHQ9
SUM OF ALL RESPONSES TO PHQ9 QUESTIONS 1 AND 2: 0
SUM OF ALL RESPONSES TO PHQ9 QUESTIONS 1 AND 2: 0
1. LITTLE INTEREST OR PLEASURE IN DOING THINGS: NOT AT ALL
CLINICAL INTERPRETATION OF PHQ2 SCORE: NO FURTHER SCREENING NEEDED
2. FEELING DOWN, DEPRESSED OR HOPELESS: NOT AT ALL

## 2024-06-24 NOTE — TELEPHONE ENCOUNTER
Francesco from [facility] called.  He states they did not receive all of the pages that were sent here for completion.  Please resend and fax to [phone].  Forms given to Farzaneh.

## 2024-07-03 LAB
CASE RPRT: NORMAL
CYTOLOGY CVX/VAG STUDY: NORMAL
CYTOLOGY CVX/VAG STUDY: NORMAL
HPV16+18+45 E6+E7MRNA CVX NAA+PROBE: NEGATIVE
Lab: NORMAL
PAP EDUCATIONAL NOTE: NORMAL
SPECIMEN ADEQUACY: NORMAL

## 2024-08-06 ENCOUNTER — TELEPHONE (OUTPATIENT)
Age: 79
End: 2024-08-06

## 2024-08-15 ENCOUNTER — TELEPHONE (OUTPATIENT)
Age: 79
End: 2024-08-15

## 2024-08-15 NOTE — TELEPHONE ENCOUNTER
I am reaching out from the [facility] Navigation department, following up on an order from your provider's office to assist in connecting you with resources for care. If you would like to discuss this further, please give us a call back at [phone], or for more immediate assistance you can contact our 24-hour help line at [phone]. We look forward to hearing from you soon.

## 2024-09-25 RX ORDER — DIPHENHYDRAMINE HCL 25 MG
50 TABLET ORAL ONCE
Qty: 2 TABLET | Refills: 0 | Status: SHIPPED | OUTPATIENT
Start: 2024-09-25 | End: 2024-09-25

## 2024-09-25 RX ORDER — PREDNISONE 50 MG/1
50 TABLET ORAL EVERY 6 HOURS
Qty: 3 TABLET | Refills: 0 | Status: SHIPPED | OUTPATIENT
Start: 2024-09-25

## 2025-03-05 NOTE — TELEPHONE ENCOUNTER
Called patient and relayed Dr Orin Sood message. Patient verbalized understanding. [facility] sent. not motivated to quit

## (undated) NOTE — LETTER
Follow up for Buster Cope    1) Your follow up with Albert Push is [email or web address] am at [place] for [address]ing [address]. 2) Call to schedule your CT Appointment in 6 months. 3) Premedication of Prednisone has been sent to your pharmacy. Also, take over the counter Benadryl 1 hour before CT Scan. Dr So Dunham office will call for results. No follow up is needed at this time for that      All the orders are in the system and also attached to these instructions. Scheduling phone # for Seanor is [phone]. Please send CD+Report of your CT if done outside of [facility] to Interventional Radiology [address] [place] [address]. If you have any other questions or concerns please call [phone].

## (undated) NOTE — MR AVS SNAPSHOT
[place]  [facility] [address] [address]  [phone]               Thank you for choosing us for your health care visit with Umer Gunter MD.  We are glad to serve you and happy to provide you with this summary of your visit.   Susan Take 1 tablet by mouth daily.    Commonly known as:  VITAMIN D3                Where to Get Your Medications      These medications were sent to [place] [id number] [facility], [address] [facility] AT [address] [place] [address], [phone], [phone]- Your blood pressure indicates you may be at-risk for Hypertension. Please consider the following Lifestyle Modifications. Also, please return for a follow-up Blood Pressure Check in 1 month.      Lifestyle Modification Recommendations:    Modification R Start activities slowly and build up over time Do what you like   Get your heart pumping – brisk walking, biking, swimming Even 10 minute increments are effective and add up over the week   2 ½ hours per week – spread out over time Use a josue to keep you

## (undated) NOTE — MR AVS SNAPSHOT
[place]  [facility] [address] [address]  [phone]               Thank you for choosing us for your health care visit with Pascale Amin MD.  We are glad to serve you and happy to provide you with this summary of your visit.   Susan Headaches in 2016    Penicillins     Other reaction(s): Unknown    Radiology Contrast Iodinated Dyes     Other reaction(s): Vomitting                Today's Vital Signs     BP Pulse Temp Height Weight BMI    124/86 mmHg 68 98 °F (36.7 °C) (Oral) 5' 5\" (1